# Patient Record
Sex: FEMALE | Race: OTHER | Employment: PART TIME | URBAN - METROPOLITAN AREA
[De-identification: names, ages, dates, MRNs, and addresses within clinical notes are randomized per-mention and may not be internally consistent; named-entity substitution may affect disease eponyms.]

---

## 2020-04-06 ENCOUNTER — TELEPHONE (OUTPATIENT)
Dept: FAMILY MEDICINE CLINIC | Age: 35
End: 2020-04-06

## 2020-04-06 NOTE — TELEPHONE ENCOUNTER
Outbound call placed to patient. name and  verified. Call placed to reschedule patients appointment due to COVID-19 pandemic. Patient declined virtual visit. Patient scheduled for 2020 08:30 AM patient reported understanding and appreciative of call.

## 2020-06-19 ENCOUNTER — VIRTUAL VISIT (OUTPATIENT)
Dept: FAMILY MEDICINE CLINIC | Age: 35
End: 2020-06-19

## 2020-06-19 DIAGNOSIS — K20.0 EE (EOSINOPHILIC ESOPHAGITIS): ICD-10-CM

## 2020-06-19 DIAGNOSIS — M25.561 CHRONIC PAIN OF RIGHT KNEE: ICD-10-CM

## 2020-06-19 DIAGNOSIS — G89.29 CHRONIC PAIN OF RIGHT KNEE: ICD-10-CM

## 2020-06-19 DIAGNOSIS — F41.8 DEPRESSION WITH ANXIETY: ICD-10-CM

## 2020-06-19 DIAGNOSIS — Z76.89 ENCOUNTER TO ESTABLISH CARE: Primary | ICD-10-CM

## 2020-06-19 DIAGNOSIS — E66.09 OBESITY DUE TO EXCESS CALORIES WITHOUT SERIOUS COMORBIDITY, UNSPECIFIED CLASSIFICATION: ICD-10-CM

## 2020-06-19 DIAGNOSIS — M50.30 DDD (DEGENERATIVE DISC DISEASE), CERVICAL: ICD-10-CM

## 2020-06-19 DIAGNOSIS — G43.909 MIGRAINE WITHOUT STATUS MIGRAINOSUS, NOT INTRACTABLE, UNSPECIFIED MIGRAINE TYPE: ICD-10-CM

## 2020-06-19 DIAGNOSIS — Z98.1 H/O SPINAL FUSION: ICD-10-CM

## 2020-06-19 RX ORDER — AMOXICILLIN 500 MG/1
TABLET, FILM COATED ORAL
COMMUNITY
Start: 2020-03-13 | End: 2020-06-19

## 2020-06-19 RX ORDER — PANTOPRAZOLE SODIUM 40 MG/1
40 TABLET, DELAYED RELEASE ORAL 2 TIMES DAILY
COMMUNITY
End: 2020-10-21 | Stop reason: SDUPTHER

## 2020-06-19 RX ORDER — DICLOFENAC SODIUM 10 MG/G
2 GEL TOPICAL
Qty: 100 G | Refills: 0 | Status: SHIPPED | OUTPATIENT
Start: 2020-06-19 | End: 2020-12-02

## 2020-06-19 RX ORDER — TOPIRAMATE 50 MG/1
50 TABLET, FILM COATED ORAL DAILY
COMMUNITY
End: 2020-10-21 | Stop reason: ALTCHOICE

## 2020-06-19 NOTE — PATIENT INSTRUCTIONS
Follow up with a behavioral therapist for evaluation and management of your mood. If you do not already see a therapist, you can find a list through [facility] by calling the mental help line number on the back of your insurance card. Here are a few local providers: 
[facility]: [phone] [facility] Mental health  [phone] (can prescribe medications) [employer] [phone] (can prescribe medications) [address] [place] [address] [phone] (can prescribe medications) [employer] Patellofemoral Pain Syndrome (Runner's Knee): Exercises Introduction Here are some examples of exercises for you to try. The exercises may be suggested for a condition or for rehabilitation. Start each exercise slowly. Ease off the exercises if you start to have pain. You will be told when to start these exercises and which ones will work best for you. How to do the exercises Calf wall stretch 1. Stand facing a wall with your hands on the wall at about eye level. Put your affected leg about a step behind your other leg. 2. Keeping your back leg straight and your back heel on the floor, bend your front knee and gently bring your hip and chest toward the wall until you feel a stretch in the calf of your back leg. 3. Hold the stretch for at least 15 to 30 seconds. 4. Repeat 2 to 4 times. 5. Repeat steps 1 through 4, but this time keep your back knee bent. Quadriceps stretch 1. If you are not steady on your feet, hold on to a chair, counter, or wall. 2. Bend your affected leg, and reach behind you to grab the front of your foot or ankle with the hand on the same side. For example, if you are stretching your right leg, use your right hand. 3. Keeping your knees next to each other, pull your foot toward your buttock until you feel a gentle stretch across the front of your hip and down the front of your thigh.  Your knee should be pointed directly to the ground, and not out to the side. 4. Hold the stretch for at least 15 to 30 seconds. 5. Repeat 2 to 4 times. Hamstring wall stretch 1. Lie on your back in a doorway, with your good leg through the open door. 2. Slide your affected leg up the wall to straighten your knee. You should feel a gentle stretch down the back of your leg. 3. Hold the stretch for at least 1 minute. Then over time, try to lengthen the time you hold the stretch to as long as 6 minutes. 4. Repeat 2 to 4 times. 5. If you do not have a place to do this exercise in a doorway, there is another way to do it: 6. Lie on your back, and bend your affected leg. 7. Loop a towel under the ball and toes of that foot, and hold the ends of the towel in your hands. 8. Straighten your knee, and slowly pull back on the towel. You should feel a gentle stretch down the back of your leg. 9. Hold the stretch for at least 15 to 30 seconds. Or even better, hold the stretch for 1 minute if you can. 10. Repeat 2 to 4 times. 1. Do not arch your back. 2. Do not bend either knee. 3. Keep one heel touching the floor and the other heel touching the wall. Do not point your toes. [facility] 1. Sit with your affected leg straight and supported on the floor or a firm bed. Place a small, rolled-up towel under your affected knee. Your other leg should be bent, with that foot flat on the floor. 2. Tighten the thigh muscles of your affected leg by pressing the back of your knee down into the towel. 3. Hold for about 6 seconds, then rest for up to 10 seconds. 4. Repeat 8 to 12 times. Straight-leg raises to the front 1. Lie on your back with your good knee bent so that your foot rests flat on the floor. Your affected leg should be straight. Make sure that your low back has a normal curve. You should be able to slip your hand in between the floor and the small of your back, with your palm touching the floor and your back touching the back of your hand. 2. Tighten the thigh muscles in your affected leg by pressing the back of your knee flat down to the floor. Hold your knee straight. 3. Keeping the thigh muscles tight and your leg straight, lift your affected leg up so that your heel is about 12 inches off the floor. 4. Hold for about 6 seconds, then lower your leg slowly. Rest for up to 10 seconds between repetitions. 5. Repeat 8 to 12 times. Straight-leg raises to the back 1. Lie on your stomach, and lift your leg straight up behind you (toward the ceiling). 2. Lift your toes about 6 inches off the floor, hold for about 6 seconds, then lower slowly. 3. Do 8 to 12 repetitions. Wall slide with ball squeeze 1. Stand with your back against a wall and with your feet about shoulder-width apart. Your feet should be about 12 inches away from the wall. 2. Put a ball about the size of a soccer ball between your knees. Then slowly slide down the wall until your knees are bent about 20 to 30 degrees. 3. Tighten your thigh muscles by squeezing the ball between your knees. Hold that position for about 10 seconds, then stop squeezing. Rest for up to 10 seconds between repetitions. 4. Repeat 8 to 12 times. Follow-up care is a key part of your treatment and safety. Be sure to make and go to all appointments, and call your doctor if you are having problems. It's also a good idea to know your test results and keep a list of the medicines you take. Where can you learn more? Go to http://adalberto-tay.info/ Enter A404 in the search box to learn more about \"Patellofemoral Pain Syndrome (Runner's Knee): Exercises. \" Current as of: March 2, 2020               Content Version: 12.5 © 5339-3991 [employer]. Care instructions adapted under license by [employer] (which disclaims liability or warranty for this information).  If you have questions about a medical condition or this instruction, always ask your healthcare professional. [address] any warranty or liability for your use of this information.

## 2020-06-19 NOTE — PROGRESS NOTES
[id number] [address] Note      Subjective:     Chief Complaint   Patient presents with    New Patient     establish care    Back Pain     x 1 year    Knee Swelling     x 2 month      Ashraf Doctor is a 36y.o. year old female who presents for virtual evaluation of the following:      Establishment of Care:  Previous PCP: Dr. Samantha Reddy in [place]  Patient Care Team:  Vera Hutchisn MD as PCP - [facility])   Dentist- None  Optho- none  [facility]  [place] - [place]  [place] in [place]    PMH:   Eosinophilic Esophagitis:   Diagnosed 2015  Sx: food sticks while eating intermittently  Tx: pantoprazole  Has had multiple esophageal dilations  Managed BY GI and needs new referral     DDD, cervical  Chronic > 5 year  S/p Disc replacement and fusion  Sx: intermittnet neck pain radiates down back  - occuring weekly  Managed by spinal specialist    Migraines:   Chronic diagnosed age 13  Occurring twice per justus in past 6 month  Tx: Topamax  Denies vision change, current headache     Depression with Anxiety:  Chronic. Diagnosed age 14  Current Mood/Symptoms: feeling more anxiety this year  Triggers:no known  Denies admission for mood or suicide attmept  Treatment:   Key Psychotherapeutic Meds     Patient is on no psychotherapeutic meds.       Previous Tx: name unknwon  Therapist: None currently   Managed by PCP  MELISSA: 4  3 most recent PHQ Screens 6/19/2020   Little interest or pleasure in doing things Several days   Feeling down, depressed, irritable, or hopeless Not at all   Total Score PHQ 2 1   Trouble falling or staying asleep, or sleeping too much Nearly every day   Feeling tired or having little energy Nearly every day   Poor appetite, weight loss, or overeating Not at all   Feeling bad about yourself - or that you are a failure or have let yourself or your family down Several days   Trouble concentrating on things such as school, work, reading, or watching TV Not at all   Moving or speaking so slowly that other people could have noticed; or the opposite being so fidgety that others notice Not at all   Thoughts of being better off dead, or hurting yourself in some way Not at all   PHQ 9 Score 8     Asthma/Seasonal Allergies  Chronic since  Age 9  Tx: albuterol prn, benadryl prn  Last albuterol use 7 months ago  Previous daily inhaler name unknwon purple disc but not used in 1year  Never amdmitted or intubated for asthma    Obesity:   Patient perception: \"I gained weight\"  Diet: Cooking more at home, skips breakfast  -No food log in office  Activity: Walking a home 3 days per week since 2/2020  Treatment:  Key Obesity Meds     Patient is on no anti-obesity meds. Previous Treatment: None  Last Weight Metrics:  No flowsheet data found.        Acute Concerns:  Right Knee Swelling/ Pain:   Onset 2 month ago  Not swollen now  Interment worse with walking   Has been walking more for exercise  Tx: ibuprofen  Deieis injury, difficulty walking, redness, fever        Social:   Employment- works for [employer] at [employer] with 3 kids, sister, and brother in law  - youngest child is autistic        Past Medical History:   Diagnosis Date    Anxiety     Asthma     EE (eosinophilic esophagitis)         Social History     Socioeconomic History    Marital status: SINGLE     Spouse name: Not on file    Number of children: Not on file    Years of education: Not on file    Highest education level: Not on file   Occupational History    Not on file   Social Needs    Financial resource strain: Not on file    Food insecurity     Worry: Not on file     Inability: Not on file   [employer] needs     Medical: Not on file     Non-medical: Not on file   Tobacco Use    Smoking status: Never Smoker    Smokeless tobacco: Never Used   Substance and Sexual Activity    Alcohol use: Not Currently    Drug use: Never    Sexual activity: Not Currently   Lifestyle    Physical activity     Days per week: Not on file     Minutes per session: Not on file    Stress: Not on file   Relationships    Social connections     Talks on phone: Not on file     Gets together: Not on file     Attends Adventism service: Not on file     Active member of club or organization: Not on file     Attends meetings of clubs or organizations: Not on file     Relationship status: Not on file    Intimate partner violence     Fear of current or ex partner: Not on file     Emotionally abused: Not on file     Physically abused: Not on file     Forced sexual activity: Not on file   Other Topics Concern    Not on file   Social History Narrative    Not on file       Family History   Problem Relation Age of Onset    Other Mother         Plantar Faciaitis    Cancer Father         prostate    Depression Father        Current Outpatient Medications   Medication Sig    pantoprazole (Protonix) 40 mg tablet Take 40 mg by mouth two (2) times a day.  topiramate (Topamax) 50 mg tablet Take 50 mg by mouth daily.  amoxicillin 500 mg tab TAKE 1 TABLET BY MOUTH EVERY 12 HOURS FOR 10 DAYS     No current facility-administered medications for this visit. Objective:   No flowsheet data found. Physical Examination:  General: Alert, cooperative, no distress, appears stated age. Obese  Eyes: Conjunctivae clear. Pupils equally round. Extraocular muscles intact. Ears: Normal appearing external ear   Nose: Nares normal appearing  Mouth/Throat: Lips, mucosa, and tongue normal. Moist mucous membranes. No tonsillar enlargement noted. Neck: Supple, symmetrical, trachea midline, no neck mass visualized. Respiratory: Breathing comfortably, in no acute respiratory distress. Cardiovascular: Visualized extremities without edema. MSK: Upper extremities normal appearing. Gait steady and unassisted.   - No knee swelling noted   Skin: No significant erythematous lesions or discoloration noted on facial skin. No rashes or lesions on exposed skin. Neurologic: No facial asymmetry. Normal gaze. Cranial nerves intact. Psychiatric: Affect appropriate. Mood euthymic. Thoughts logical. Speech volume and speed normal. No hallucinations. Well kempt. No visits with results within 3 Month(s) from this visit. Latest known visit with results is:   No results found for any previous visit. Assessment/ Plan:   Diagnoses and all orders for this visit:    1. Encounter to establish care    2. EE (eosinophilic esophagitis)    3. DDD (degenerative disc disease), cervical    4. H/O spinal fusion    5. Migraine without status migrainosus, not intractable, unspecified migraine type    6. Depression with anxiety    7. Obesity due to excess calories without serious comorbidity, unspecified classification    8. Chronic pain of right knee        For today's visit, I did the following:  · Reviewed PMH as listed in orders  Eosinophilic Esophagitis, per patient report. Stable. . Referral to GI to establish care. Diet and lifestyle modification encouraged for weight loss and chronic disease prevention/ management. Knee pain likely patellofemoral syndrome from recent walking. Start daily knee stretches and topical NSIAD. Chronic neck pain from cerical DDD, S.p fusion   -Exercises and NSAIDs for musculoskeletal concern  Depression with anxiety well controlled of medications. On the basis of positive PHQ-9 screening (PHQ 9 Score: 8), patient instructed to schedule follow-up visit at this practice. Patient will follow-up in 3 months. Encouraged counseling for mood disorder. Follow up with specialists per routine. We discussed the expected course, resolution and complications of the diagnosis(es) in detail. Medication risks, benefits, costs, interactions, and alternatives were discussed as indicated.   I advised her to contact the office if her condition worsens, changes or fails to improve as anticipated. She expressed understanding with the diagnosis(es) and plan. Shannen Langford is a 36 y.o. female being evaluated by a video visit encounter for concerns as above. A caregiver was present when appropriate. Due to this being a TeleHealth encounter (During [facility] public health emergency), evaluation of the following organ systems was limited: Vitals/Constitutional/EENT/Resp/CV/GI//MS/Neuro/Skin/Heme-Lymph-Imm. Pursuant to the emergency declaration under the [address] [place] [address], [facility] waiver authority and the [employer] Act, this Virtual  Visit was conducted, with patient's (and/or legal guardian's) consent, to reduce the patient's risk of exposure to COVID-19 and provide necessary medical care. Services were provided through a video synchronous discussion virtually to substitute for in-person clinic visit. Provider was at home while conducting this encounter. Patient was at home during encounter. Other persons participating in call: None  Consent:  She and/or her healthcare decision maker is aware that this patient-initiated Telehealth encounter is a billable service, with coverage as determined by her insurance carrier. She is aware that she may receive a bill and has provided verbal consent to proceed: Yes  This virtual visit was conducted via [employer] me. Pursuant to the emergency declaration under the [address] [place] [address], [facility] authority and the [employer] Act, this Virtual  Visit was conducted to reduce the patient's risk of exposure to COVID-19 and provide continuity of care for an established patient. Services were provided through a video synchronous discussion virtually to substitute for in-person clinic visit.   Due to this being a TeleHealth evaluation, many elements of the physical examination are unable to be assessed. Total Time: minutes: 21-30 minutes. Educated patient on red flag symptoms to warrant return to clinic or emergency room visit. I have discussed the diagnosis with the patient and the intended plan as seen in the above orders. The patient has been offered or received an after-visit summary and questions were answered concerning future plans. I have discussed medication side effects and warnings with the patient as well. Follow-up and Dispositions    · Return in about 3 months (around 9/19/2020) for Physical exam, Follow Up.          Signed,    Ricardo Ceballos MD  6/19/2020

## 2020-06-19 NOTE — PROGRESS NOTES
Chief Complaint   Patient presents with    New Patient     establish care    Back Pain     x 1 year    Knee Swelling     x 2 month      1. Have you been to the ER, urgent care clinic since your last visit? Hospitalized since your last visit? No    2. Have you seen or consulted any other health care providers outside of the [address] [place] [address] since your last visit? Include any pap smears or colon screening.  No  r

## 2020-09-13 ENCOUNTER — TELEPHONE (OUTPATIENT)
Dept: FAMILY MEDICINE CLINIC | Age: 35
End: 2020-09-13

## 2020-09-13 NOTE — TELEPHONE ENCOUNTER
Chief Complaint   Patient presents with    Prior Auth     DICLOFENAC SODIUM 1 % GEL      AWAITING RESPONSE.   Torri Vidales LPN

## 2020-09-18 ENCOUNTER — TELEPHONE (OUTPATIENT)
Dept: FAMILY MEDICINE CLINIC | Age: 35
End: 2020-09-18

## 2020-09-18 NOTE — TELEPHONE ENCOUNTER
Called Pt and left vm. Notified to call back about the request for Diclofenac sodium gel 1% was denied. Stated to give the office a call back to let her know to try OTC volteran gel or lidocaine gel.

## 2020-10-21 ENCOUNTER — OFFICE VISIT (OUTPATIENT)
Dept: FAMILY MEDICINE CLINIC | Age: 35
End: 2020-10-21
Payer: MEDICAID

## 2020-10-21 VITALS
OXYGEN SATURATION: 99 % | HEART RATE: 92 BPM | HEIGHT: 62 IN | SYSTOLIC BLOOD PRESSURE: 102 MMHG | RESPIRATION RATE: 18 BRPM | DIASTOLIC BLOOD PRESSURE: 75 MMHG | WEIGHT: 180.4 LBS | BODY MASS INDEX: 33.2 KG/M2

## 2020-10-21 DIAGNOSIS — M54.50 CHRONIC BILATERAL LOW BACK PAIN WITHOUT SCIATICA: ICD-10-CM

## 2020-10-21 DIAGNOSIS — G89.29 CHRONIC BILATERAL LOW BACK PAIN WITHOUT SCIATICA: ICD-10-CM

## 2020-10-21 DIAGNOSIS — E55.9 VITAMIN D DEFICIENCY: ICD-10-CM

## 2020-10-21 DIAGNOSIS — G43.909 MIGRAINE WITHOUT STATUS MIGRAINOSUS, NOT INTRACTABLE, UNSPECIFIED MIGRAINE TYPE: ICD-10-CM

## 2020-10-21 DIAGNOSIS — K20.0 EE (EOSINOPHILIC ESOPHAGITIS): ICD-10-CM

## 2020-10-21 DIAGNOSIS — M50.30 DDD (DEGENERATIVE DISC DISEASE), CERVICAL: ICD-10-CM

## 2020-10-21 DIAGNOSIS — E66.09 OBESITY DUE TO EXCESS CALORIES WITHOUT SERIOUS COMORBIDITY, UNSPECIFIED CLASSIFICATION: ICD-10-CM

## 2020-10-21 DIAGNOSIS — F41.8 DEPRESSION WITH ANXIETY: ICD-10-CM

## 2020-10-21 DIAGNOSIS — F33.0 DEPRESSION, MAJOR, RECURRENT, MILD (HCC): ICD-10-CM

## 2020-10-21 DIAGNOSIS — M79.672 PAIN OF LEFT HEEL: ICD-10-CM

## 2020-10-21 DIAGNOSIS — Z00.00 WELL WOMAN EXAM (NO GYNECOLOGICAL EXAM): Primary | ICD-10-CM

## 2020-10-21 PROCEDURE — 99395 PREV VISIT EST AGE 18-39: CPT | Performed by: FAMILY MEDICINE

## 2020-10-21 PROCEDURE — 99213 OFFICE O/P EST LOW 20 MIN: CPT | Performed by: FAMILY MEDICINE

## 2020-10-21 RX ORDER — AMITRIPTYLINE HYDROCHLORIDE 25 MG/1
25 TABLET, FILM COATED ORAL
Qty: 90 TAB | Refills: 1 | Status: SHIPPED | OUTPATIENT
Start: 2020-10-21 | End: 2020-12-02

## 2020-10-21 RX ORDER — PANTOPRAZOLE SODIUM 40 MG/1
40 TABLET, DELAYED RELEASE ORAL 2 TIMES DAILY
Qty: 180 TAB | Refills: 1 | Status: SHIPPED | OUTPATIENT
Start: 2020-10-21 | End: 2021-07-04

## 2020-10-21 RX ORDER — TOPIRAMATE 50 MG/1
50 TABLET, FILM COATED ORAL DAILY
Status: CANCELLED | OUTPATIENT
Start: 2020-10-21

## 2020-10-21 NOTE — PROGRESS NOTES
[facility] Note      Subjective:     Chief Complaint   Patient presents with    Complete Physical     Youlanda Stairs is a 30y.o. year old female who presents for virtual evaluation of the following:      PMH:   Eosinophilic Esophagitis:   Diagnosed 2015  Sx: food sticks while eating intermittently  Tx: pantoprazole bid  - ran out ans symptoms worsening. Using tums instead  Has had multiple esophageal dilations  Managed BY GI   - plans to see Gi in 11/2oso    DDD, cervical  Chronic > 5 year  S/p Disc replacement and fusion  Sx: intermittent neck pain radiates down back  Managed by spinal specialist    Migraines:   Chronic diagnosed age 13  Occurring twice per justus in past 6 month  Tx: Topamax  - ran out 3 weeks ago  - states not helping in the past few months  Denies vision change, current headache     Depression with Anxiety:  Chronic. Diagnosed age 14  Current Mood/Symptoms: feeling more anxiety this year  Triggers:no known  Denies admission for mood or suicide attmept  Treatment:   Key Psychotherapeutic Meds     Patient is on no psychotherapeutic meds.       Previous Tx: name unknwon  Therapist: None currently   Managed by PCP  MELISSA: 4> 8  3 most recent PHQ Screens 10/21/2020   Little interest or pleasure in doing things Not at all   Feeling down, depressed, irritable, or hopeless Several days   Total Score PHQ 2 1   Trouble falling or staying asleep, or sleeping too much Nearly every day   Feeling tired or having little energy More than half the days   Poor appetite, weight loss, or overeating Not at all   Feeling bad about yourself - or that you are a failure or have let yourself or your family down Not at all   Trouble concentrating on things such as school, work, reading, or watching TV Several days   Moving or speaking so slowly that other people could have noticed; or the opposite being so fidgety that others notice Not at all   Thoughts of being better off dead, or hurting yourself in some way Not at all   PHQ 9 Score 7       Left Heel Pain  Onset 1 month ago   Intermittent  Character sharp  Worse first thing in the morning 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 Shoes -addida boosts- 3year old  Tx: Ibuprofen prn  Multiple family members with plantar fasciitis. Low Back Pain  Chronic > 1 year bilateral low back   Tx: ibuprofen prn  No reported injury  Known Cervical DDD  Paul pain radiation     Obesity:   Patient perception: \"I gained weight\"  Diet: avoiding fast food, avoiding pasta and breading, avoid hig sodium broth  -No food log in office  Activity: Walking a home 3 days per week since 2/2020  Treatment:  Key Obesity Meds     Patient is on no anti-obesity meds. Previous Treatment: None  Last Weight Metrics:  Weight Loss Metrics 10/21/2020   Today's Wt 180 lb 6.4 oz   BMI 33 kg/m2        Health Maintenance:   Health Maintenance   Topic Date Due    DTaP/Tdap/Td series (1 - Tdap) 09/14/2007    PAP AKA CERVICAL CYTOLOGY  09/14/2007    Flu Vaccine (1) 06/30/2021 (Originally 9/1/2020)    Pneumococcal 0-64 years  Aged Out   - Tdap in pregnancy 2017    HIV or other STD testing: Declined  Domestic Violence Screen:   Abuse Screening Questionnaire 6/19/2020   Do you ever feel afraid of your partner? N   Are you in a relationship with someone who physically or mentally threatens you? N   Is it safe for you to go home?  Y       Depression Screen:   3 most recent PHQ Screens 10/21/2020   Little interest or pleasure in doing things Not at all   Feeling down, depressed, irritable, or hopeless Several days   Total Score PHQ 2 1   Trouble falling or staying asleep, or sleeping too much -   Feeling tired or having little energy -   Poor appetite, weight loss, or overeating -   Feeling bad about yourself - or that you are a failure or have let yourself or your family down -   Trouble concentrating on things such as school, work, reading, or watching TV - Moving or speaking so slowly that other people could have noticed; or the opposite being so fidgety that others notice -   Thoughts of being better off dead, or hurting yourself in some way -   PHQ 9 Score -       Smoker? never      Pap:  Last   Mammogram: Not due  No LMP recorded. (Menstrual status: IUD). reports previously being sexually active.       Social:   Employment- works for [email or web address] with 3 kids, sister, and brother in law  - youngest child is autistic    Patient Care Team:  Wanda Melo MD as PCP - General (Family Medicine)  Wanda Melo MD as PCP - [facility] Empaneled Provider   Dentist- None  Optho- none  GYN- [facility]  [place] - [place] in [place]          Past Medical History:   Diagnosis Date    Anxiety     Asthma     EE (eosinophilic esophagitis)         Social History     Socioeconomic History    Marital status: SINGLE     Spouse name: Not on file    Number of children: Not on file    Years of education: Not on file    Highest education level: Not on file   Occupational History    Not on file   Social Needs    Financial resource strain: Not on file    Food insecurity     Worry: Not on file     Inability: Not on file    Transportation needs     Medical: Not on file     Non-medical: Not on file   Tobacco Use    Smoking status: Never Smoker    Smokeless tobacco: Never Used   Substance and Sexual Activity    Alcohol use: Not Currently    Drug use: Never    Sexual activity: Not Currently   Lifestyle    Physical activity     Days per week: Not on file     Minutes per session: Not on file    Stress: Not on file   Relationships    Social connections     Talks on phone: Not on file     Gets together: Not on file     Attends Mormonism service: Not on file     Active member of club or organization: Not on file     Attends meetings of clubs or organizations: Not on file     Relationship status: Not on file    Intimate partner violence     Fear of current or ex partner: Not on file     Emotionally abused: Not on file     Physically abused: Not on file     Forced sexual activity: Not on file   Other Topics Concern    Not on file   Social History Narrative    Not on file       Family History   Problem Relation Age of Onset    Other Mother         Plantar Faciaitis    Cancer Father         prostate    Depression Father        Current Outpatient Medications   Medication Sig    pantoprazole (Protonix) 40 mg tablet Take 40 mg by mouth two (2) times a day.  topiramate (Topamax) 50 mg tablet Take 50 mg by mouth daily.  diclofenac (VOLTAREN) 1 % gel Apply 2 g to affected area four (4) times daily as needed for Pain. (Patient not taking: Reported on 10/21/2020)     No current facility-administered medications for this visit. Objective:   Visit Vitals  /75   Pulse 92   Resp 18   Ht 5' 2\" (1.575 m)   Wt 180 lb 6.4 oz (81.8 kg)   SpO2 99%   BMI 33.00 kg/m²     Physical Examination:  General: Alert, cooperative, no distress, appears stated age. Obese  Eyes: Conjunctivae clear. Pupils equally round and reactive to light, Extraocular muscles intact. Ears: Normal external ear canals both ears. Tympanic membranes clear and mobile bilaterally   Nose: Nares normal. Septum midline. Mucosa normal. No drainage or sinus tenderness. Mouth/Throat: Lips, mucosa, and tongue normal. No oropharyngeal erythema. No tonsillar enlargement or exudate. Neck: Supple, symmetrical, trachea midline, no adenopathy. No thyroid enlargement/tenderness/nodules  Respiratory: Breathing comfortably, in no acute respiratory distress. Clear to auscultation bilaterally. Normal inspiratory and expiratory ratio. Cardiovascular: Regular rate and rhythm, S1, S2 normal, no murmur, click, rub or gallop.   -Extremities no edema. Pulses 2+ and symmetric radial and dorsalis pedis   Abdomen: Soft, non-tender, not distended.  Bowel sounds normal. No masses or organomegaly. MSK: Extremities normal appearing, atraumatic, no effusion. Gait steady and unassisted. Back symmetric, no curvature. Range of motion normal. No Costovertebral angle tenderness.  -Negative straight leg raise bilaterally.  -Lumbar paraspinal muscle tenderness bilaterally. No SI joint tenderness.  -Left heel nontender, no deformity, ankle with full range of motion. Skin: Skin color, texture, turgor normal. No rashes or lesions on exposed skin. Lymph nodes: Cervical, supraclavicular nodes normal.  Neurologic: Cranial nerves II-XII intact. Strength 5/5 grossly. Sensation and reflexes normal throughout. Psychiatric: Affect mild anxious- difficulty relaxing muscle during foot examinaiton. Mood anxious. Thoughts logical. Speech volume and speed normal        No visits with results within 3 Month(s) from this visit. Latest known visit with results is:   No results found for any previous visit. Assessment/ Plan:   Diagnoses and all orders for this visit:    1. Well woman exam (no gynecological exam)  -     CBC WITH AUTOMATED DIFF  -     METABOLIC PANEL, COMPREHENSIVE  -     LIPID PANEL    2. Chronic bilateral low back pain without sciatica  -     XR SPINE LUMB 2 OR 3 V; Future  -     REFERRAL TO PHYSICAL THERAPY  -     CK    3. EE (eosinophilic esophagitis)  -     pantoprazole (Protonix) 40 mg tablet; Take 1 Tab by mouth two (2) times a day. 4. Migraine without status migrainosus, not intractable, unspecified migraine type  -     amitriptyline (ELAVIL) 25 mg tablet; Take 1 Tab by mouth nightly. 5. Depression with anxiety  -     REFERRAL TO BEHAVIORAL HEALTH  -     amitriptyline (ELAVIL) 25 mg tablet; Take 1 Tab by mouth nightly. 6. Depression, major, recurrent, mild ([address]    7. DDD (degenerative disc disease), cervical    8. Pain of left heel    9. Vitamin D deficiency  -     VITAMIN D, 25 HYDROXY    10.  Obesity due to excess calories without serious comorbidity, unspecified classification  -     TSH REFLEX TO T4        For today's visit, I did the following:  · Reviewed PMH as listed in orders  Eosinophilic Esophagitis, per patient report. Stable. . We will refill pantoprazole. Follow-up with GI per routine. Chronic migraines, worsening recently. Suspect triggered by stress. Referral to behavioral specialist for stress management. Trial amitriptyline for migraine prevention. Diet and lifestyle modification encouraged for weight loss and chronic disease prevention/ management. Chronic neck pain from cerical DDD and chronc low back pain liekly lumbar DDD or muscle strain. Referral to physical therapy for evaluation and treatment. He will pain likely plantar fasciitis versus tendinitis. Start daily foot stretches to improve pain. Trial topical over-the-counter pain creams such as Voltaren or lidocaine. Monitor vitamin D with labs. Depression with anxiety mild uncontrolled. We will add amitriptyline for migraines which may help with depression component. If anxiety worsens consider change from amitriptyline to venlafaxine. On the basis of positive PHQ-9 screening (PHQ 9 Score: 7), patient instructed to schedule follow-up visit at this practice. Patient will follow-up in 3 months. Encouraged counseling for mood disorder-referred to behavioral health specialist.  Follow up with specialists per routine. We discussed the expected course, resolution and complications of the diagnosis(es) in detail. Medication risks, benefits, costs, interactions, and alternatives were discussed as indicated. I advised her to contact the office if her condition worsens, changes or fails to improve as anticipated. She expressed understanding with the diagnosis(es) and plan. Educated patient on red flag symptoms to warrant return to clinic or emergency room visit. I have discussed the diagnosis with the patient and the intended plan as seen in the above orders.   The patient has been offered or received an after-visit summary and questions were answered concerning future plans. I have discussed medication side effects and warnings with the patient as well. Follow-up and Dispositions    · Return in about 3 months (around 1/21/2021) for Follow Up migraines and mood.          Signed,    Felipe Kirby MD  10/21/2020

## 2020-10-21 NOTE — PROGRESS NOTES
Identified pt with two pt identifiers(name and ). Reviewed record in preparation for visit and have obtained necessary documentation. Chief Complaint   Patient presents with    Complete Physical        Health Maintenance Due   Topic    PAP AKA CERVICAL CYTOLOGY      Flu vaccine: Pt declined. DTaP: Pt will discuss      Visit Vitals  /75   Pulse 92   Resp 18   Ht 5' 2\" (1.575 m)   Wt 180 lb 6.4 oz (81.8 kg)   SpO2 99%   BMI 33.00 kg/m²         Coordination of Care Questionnaire:  :   1) Have you been to an emergency room, urgent care, or hospitalized since your last visit? If yes, where when, and reason for visit? no       2. Have seen or consulted any other health care provider since your last visit? If yes, where when, and reason for visit? NO      Patient is accompanied by self I have received verbal consent from Stacy Cerda to discuss any/all medical information while they are present in the room.

## 2020-10-21 NOTE — PATIENT INSTRUCTIONS
Weight Loss Tips: 
Remember this is like a part time job so your motivation and commitment is key to your success. Mindset Weight loss like any other behavior change starts in your mind. Think hard about what your motivates you to lose weight then meditate on that. Remind yourself of your motivation 
with phone alarms, scheduled meditation time, vision board, journal- just to name a few ideas. Have realistic goals. We expect with diligent healthy diet and physical activity you can lose 5% of your body weight in 3 
months. Wt in lbs x 0.05 = #lbs you should lose in 3 months. Make food and activity changes with a goal of CONSISTENCY not perfection. Food Start eating differently. Most of your weight loss and gain is from what you eat. Use small plates only Drink 2 liters (1/2 gallon) of water every day HALF of every meal should be fruit or vegetables Try meal prepping on Sunday (or your day off) with new different vegetables. Consider meal prep service such as [employer], wepremeals. com Replace soda with diet soda or other zero sugar drinks (selter water just fine) Consider using the Relcy viktor for calorie counting. Goal 5200-4263 calories per day Activity Staying physically active will help you lose more weight and can help you get over the plateau when you weight just 
won't change any more with diet. Start exercise at least 5 days per week for 40 minutes. Consider Denty's training viktor for home exercises. You can start with walking. I suggest walking at a speed of at least 3.5-4.5mph to for the weight loss benefit. Increase your speed or distance every 2 weeks. Do some slow stretching daily of legs, arms and back. Consider adding weight training with light weights at home or at the gym. See a doctor or a physical training for 
instructions in order to avoid injuries from doing muscle training incorrectly.  
Free fitness program in [place]: [employer] [email or web address] 
 
 
  
Plantar Fasciitis: Exercises Introduction Here are some examples of exercises for you to try. The exercises may be suggested for a condition or for rehabilitation. Start each exercise slowly. Ease off the exercises if you start to have pain. You will be told when to start these exercises and which ones will work best for you. How to do the exercises Towel stretch 1. Sit with your legs extended and knees straight. 2. Place a towel around your foot just under the toes. 3. Hold each end of the towel in each hand, with your hands above your knees. 4. Pull back with the towel so that your foot stretches toward you. 5. Hold the position for at least 15 to 30 seconds. 6. Repeat 2 to 4 times a session, up to 5 sessions a day. Calf stretch This exercise stretches the muscles at the back of the lower leg (the calf) and the Achilles tendon. Do this exercise 3 or 4 times a day, 5 days a week. 1. Stand facing a wall with your hands on the wall at about eye level. Put the leg you want to stretch about a step behind your other leg. 2. Keeping your back heel on the floor, bend your front knee until you feel a stretch in the back leg. 3. Hold the stretch for 15 to 30 seconds. Repeat 2 to 4 times. Plantar fascia and calf stretch Stretching the plantar fascia and calf muscles can increase flexibility and decrease heel pain. You can do this exercise several times each day and before and after activity. 1. Stand on a step as shown above. Be sure to hold on to the banister. 2. Slowly let your heels down over the edge of the step as you relax your calf muscles. You should feel a gentle stretch across the bottom of your foot and up the back of your leg to your knee. 3. Hold the stretch about 15 to 30 seconds, and then tighten your calf muscle a little to bring your heel back up to the level of the step. Repeat 2 to 4 times. Towel curls Make this exercise more challenging by placing a weighted object, such as a soup can, on the other end of the towel. 1. While sitting, place your foot on a towel on the floor and scrunch the towel toward you with your toes. 2. Then, also using your toes, push the towel away from you. Spring pickups 1. Put marbles on the floor next to a cup. 
2. Using your toes, try to lift the marbles up from the floor and put them in the cup. Follow-up care is a key part of your treatment and safety. Be sure to make and go to all appointments, and call your doctor if you are having problems. It's also a good idea to know your test results and keep a list of the medicines you take. Where can you learn more? Go to http://www.salvador.com/ Live Cannon in the search box to learn more about \"Plantar Fasciitis: Exercises. \" Current as of: March 2, 2020               Content Version: 12.6 © 2006-2020 [employer]. Care instructions adapted under license by [employer] (which disclaims liability or warranty for this information). If you have questions about a medical condition or this instruction, always ask your healthcare professional. [address] any warranty or liability for your use of this information. Low Back Pain: Exercises Introduction Here are some examples of exercises for you to try. The exercises may be suggested for a condition or for rehabilitation. Start each exercise slowly. Ease off the exercises if you start to have pain. You will be told when to start these exercises and which ones will work best for you. How to do the exercises Press-up 1. Lie on your stomach, supporting your body with your forearms. 2. Press your elbows down into the floor to raise your upper back. As you do this, relax your stomach muscles and allow your back to arch without using your back muscles.  As your press up, do not let your hips or pelvis come off the floor. 3. Hold for 15 to 30 seconds, then relax. 4. Repeat 2 to 4 times. Alternate arm and leg (bird dog) exercise Do this exercise slowly. Try to keep your body straight at all times, and do not let one hip drop lower than the other. 1. Start on the floor, on your hands and knees. 2. Tighten your belly muscles. 3. Raise one leg off the floor, and hold it straight out behind you. Be careful not to let your hip drop down, because that will twist your trunk. 4. Hold for about 6 seconds, then lower your leg and switch to the other leg. 5. Repeat 8 to 12 times on each leg. 6. Over time, work up to holding for 10 to 30 seconds each time. 7. If you feel stable and secure with your leg raised, try raising the opposite arm straight out in front of you at the same time. Knee-to-chest exercise 1. Lie on your back with your knees bent and your feet flat on the floor. 2. Bring one knee to your chest, keeping the other foot flat on the floor (or keeping the other leg straight, whichever feels better on your lower back). 3. Keep your lower back pressed to the floor. Hold for at least 15 to 30 seconds. 4. Relax, and lower the knee to the starting position. 5. Repeat with the other leg. Repeat 2 to 4 times with each leg. 6. To get more stretch, put your other leg flat on the floor while pulling your knee to your chest.   
Curl-ups 1. Lie on the floor on your back with your knees bent at a 90-degree angle. Your feet should be flat on the floor, about 12 inches from your buttocks. 2. Cross your arms over your chest. If this bothers your neck, try putting your hands behind your neck (not your head), with your elbows spread apart. 3. Slowly tighten your belly muscles and raise your shoulder blades off the floor. 4. Keep your head in line with your body, and do not press your chin to your chest. 
5. Hold this position for 1 or 2 seconds, then slowly lower yourself back down to the floor. 6. Repeat 8 to 12 times. Pelvic tilt exercise 1. Lie on your back with your knees bent. 2. \"Brace\" your stomach. This means to tighten your muscles by pulling in and imagining your belly button moving toward your spine. You should feel like your back is pressing to the floor and your hips and pelvis are rocking back. 3. Hold for about 6 seconds while you breathe smoothly. 4. Repeat 8 to 12 times. Heel dig bridging 1. Lie on your back with both knees bent and your ankles bent so that only your heels are digging into the floor. Your knees should be bent about 90 degrees. 2. Then push your heels into the floor, squeeze your buttocks, and lift your hips off the floor until your shoulders, hips, and knees are all in a straight line. 3. Hold for about 6 seconds as you continue to breathe normally, and then slowly lower your hips back down to the floor and rest for up to 10 seconds. 4. Do 8 to 12 repetitions. Hamstring stretch in doorway 1. Lie on your back in a doorway, with one leg through the open door. 2. Slide your leg up the wall to straighten your knee. You should feel a gentle stretch down the back of your leg. 3. Hold the stretch for at least 15 to 30 seconds. Do not arch your back, point your toes, or bend either knee. Keep one heel touching the floor and the other heel touching the wall. 4. Repeat with your other leg. 5. Do 2 to 4 times for each leg. Hip flexor stretch 1. Kneel on the floor with one knee bent and one leg behind you. Place your forward knee over your foot. Keep your other knee touching the floor. 2. Slowly push your hips forward until you feel a stretch in the upper thigh of your rear leg. 3. Hold the stretch for at least 15 to 30 seconds. Repeat with your other leg. 4. Do 2 to 4 times on each side. Wall sit 1. Stand with your back 10 to 12 inches away from a wall. 2. Lean into the wall until your back is flat against it. 3. Slowly slide down until your knees are slightly bent, pressing your lower back into the wall. 4. Hold for about 6 seconds, then slide back up the wall. 5. Repeat 8 to 12 times. Follow-up care is a key part of your treatment and safety. Be sure to make and go to all appointments, and call your doctor if you are having problems. It's also a good idea to know your test results and keep a list of the medicines you take. Where can you learn more? Go to http://www.gray.com/ Enter P627 in the search box to learn more about \"Low Back Pain: Exercises. \" Current as of: March 2, 2020               Content Version: 12.6 © 2006-2020 [employer]. Care instructions adapted under license by [employer] (which disclaims liability or warranty for this information). If you have questions about a medical condition or this instruction, always ask your healthcare professional. [address] any warranty or liability for your use of this information.

## 2020-10-23 ENCOUNTER — TELEPHONE (OUTPATIENT)
Dept: FAMILY MEDICINE CLINIC | Age: 35
End: 2020-10-23

## 2020-10-23 NOTE — TELEPHONE ENCOUNTER
Made outgoing call to pt 10/22 and lvm requesting return call to r/s her appt with Alise Brooks. Made outgoing call to pt 10/23 and lvm requesting return call to r/s her appt with Alise Brooks on 10/28. MyChart message sent to pt letting her know that we have been trying to reach her. Advised pt in message appt has been canceled and to give our office a call back to r/s .

## 2020-10-28 ENCOUNTER — VIRTUAL VISIT (OUTPATIENT)
Dept: FAMILY MEDICINE CLINIC | Age: 35
End: 2020-10-28
Payer: MEDICAID

## 2020-10-28 DIAGNOSIS — F41.0 GENERALIZED ANXIETY DISORDER WITH PANIC ATTACKS: Primary | ICD-10-CM

## 2020-10-28 DIAGNOSIS — F41.1 GENERALIZED ANXIETY DISORDER WITH PANIC ATTACKS: Primary | ICD-10-CM

## 2020-10-28 PROCEDURE — 90791 PSYCH DIAGNOSTIC EVALUATION: CPT | Performed by: SOCIAL WORKER

## 2020-10-28 NOTE — PROGRESS NOTES
This note will not be vie  wable in [address] for the following reason(s). This is a Psychotherapy Note. ([address], [facility] [address] Providers Only)   Virtual At HOME Initial Appt:  Met with ms. Luis A Ferro today for our first appt. She reports that she experiences anxiety on a daily basis. Recently she has also had a few panic attacks, which is a new symptom. Additionally she reports lots of difficulty with her nightly sleeping. On an average, Ms. Luis A Ferro reports sleeping 4-5 hours per night. As she experiences frequent migraines, her sleep cycle is not helping prevent her migraines. She states that as a result of poor sleep, she is more irritable and loses her temper more often. As she has children at home for virtual learning, her irritability is a problem. Ms. Luis A Ferro is originally from [place] where her parents reside. Her father, 76, has just come to [place] to help  Her out with the kids  Her mother, 62, is still working and living in [place]. She has two older brothers: 36 and 39. One 39year old brother, according to Ms. Luis A Ferro is addicted to [employer]. \"  She is worried about him. Her father has hx of alcoholism and stopped drinking when the patient was in [employer] school. Ms Luis A Ferro had moved here in 2013, then moved back to [place]. To help her family. She again moved to [place] again several years ago. She has 3 children from 3 different relationships: a 15year old son, 5year old daughter and a 1year old son that was dx with Autism. 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 She was living on unemployment for several months until her business reopened. Ms. Luis A Ferro has experienced anxiety for many years. She was in counseling in  2009 but her sessions were sporadic. She reports she did not really find it helpful. We have scheduled a follow up session for two weeks from today, November 11 @ 2.   Radha Mckinley LCSW

## 2020-11-06 ENCOUNTER — VIRTUAL VISIT (OUTPATIENT)
Dept: FAMILY MEDICINE CLINIC | Age: 35
End: 2020-11-06
Payer: MEDICAID

## 2020-11-06 DIAGNOSIS — L98.9 SKIN LESION: ICD-10-CM

## 2020-11-06 DIAGNOSIS — M50.30 DDD (DEGENERATIVE DISC DISEASE), CERVICAL: Primary | ICD-10-CM

## 2020-11-06 PROCEDURE — 99214 OFFICE O/P EST MOD 30 MIN: CPT | Performed by: FAMILY MEDICINE

## 2020-11-06 NOTE — PROGRESS NOTES
Alexandr Form [facility] Note      Subjective:     Chief Complaint   Patient presents with    Neck Pain     Sx for 7-8 days now     Stewart Bernard is a 30y.o. year old female who presents for virtual evaluation of the following:      DDD, cervical  Chronic > 5 year  S/p Disc replacement and fusion  Sx: intermittnet neck pain radiates down back  - occuring weekly  Managed [place] spinal specialist in another state previously requesting new referral  Seen at urgent care this week and advised to go see orthopedist  - Xray with no acute fracture and with post surgical changes.    Tx: Steroid, tylenol #3    Spot on Leg  Present > 1 month ago   Has been cetting larger and darker  Previous PCP planned to refer to dermatology, patient requesting referral      Patient Care Team:  Dentist- None  Optho- none  [address] [place] [address]  Neck Specialist in [place]  Social:   Employment- works for [employer] at [employer] with 3 kids, sister, and brother in law  - youngest child is autistic        Past Medical History:   Diagnosis Date    Anxiety     Asthma     EE (eosinophilic esophagitis)         Social History     Socioeconomic History    Marital status: SINGLE     Spouse name: Not on file    Number of children: Not on file    Years of education: Not on file    Highest education level: Not on file   Occupational History    Not on file   Social Needs    Financial resource strain: Not on file    Food insecurity     Worry: Not on file     Inability: Not on file   [employer] needs     Medical: Not on file     Non-medical: Not on file   Tobacco Use    Smoking status: Never Smoker    Smokeless tobacco: Never Used   Substance and Sexual Activity    Alcohol use: Not Currently    Drug use: Never    Sexual activity: Not Currently   Lifestyle    Physical activity     Days per week: Not on file     Minutes per session: Not on file    Stress: Not on file Relationships    Social connections     Talks on phone: Not on file     Gets together: Not on file     Attends Confucianism service: Not on file     Active member of club or organization: Not on file     Attends meetings of clubs or organizations: Not on file     Relationship status: Not on file    Intimate partner violence     Fear of current or ex partner: Not on file     Emotionally abused: Not on file     Physically abused: Not on file     Forced sexual activity: Not on file   Other Topics Concern    Not on file   Social History Narrative    Not on file       Family History   Problem Relation Age of Onset    Other Mother         Plantar Faciaitis    Cancer Father         prostate    Depression Father        Current Outpatient Medications   Medication Sig    cyclobenzaprine HCl (FLEXERIL PO) Take 5 mg by mouth.  acetaminophen with codeine (TYLENOL-CODEINE #3 PO) Take 325 mg by mouth.  pantoprazole (Protonix) 40 mg tablet Take 1 Tab by mouth two (2) times a day.  amitriptyline (ELAVIL) 25 mg tablet Take 1 Tab by mouth nightly.  diclofenac (VOLTAREN) 1 % gel Apply 2 g to affected area four (4) times daily as needed for Pain. (Patient not taking: Reported on 10/21/2020)     No current facility-administered medications for this visit. Objective:   No flowsheet data found. Physical Examination:  General: Alert, cooperative, no distress, appears stated age. Obese  Eyes: Conjunctivae clear. Pupils equally round. Extraocular muscles intact. Ears: Normal appearing external ear   Nose: Nares normal appearing  Mouth/Throat: Lips, mucosa, and tongue normal. Moist mucous membranes. No tonsillar enlargement noted. Neck: Supple, symmetrical, trachea midline, no neck mass visualized. Respiratory: Breathing comfortably, in no acute respiratory distress. Cardiovascular: Visualized extremities without edema. MSK: Upper extremities normal appearing. Gait steady and unassisted.   - No knee swelling noted   Skin: Appearance of subcentimeter hyperpigmented macule on leg  Neurologic: No facial asymmetry. Normal gaze. Cranial nerves intact. Psychiatric: Affect appropriate. Mood euthymic. Thoughts logical. Speech volume and speed normal. No hallucinations. Well kempt. No visits with results within 3 Month(s) from this visit. Latest known visit with results is:   No results found for any previous visit. Assessment/ Plan:   Diagnoses and all orders for this visit:    1. DDD (degenerative disc disease), cervical  -     REFERRAL TO SPINE SURGERY    2. Skin lesion  -     REFERRAL TO DERMATOLOGY      Referred to specialist as requested to evaluate acute on chronic neck pain and skin lesion.   - Neck pain is likely chronic cervical stenosis vs muscle strain. Continue steroids as provided by urgent care  - Skin lesion is likely benign nevus but size enlarged and need skin biopsy to rule out malignancy. We discussed the expected course, resolution and complications of the diagnosis(es) in detail. Medication risks, benefits, costs, interactions, and alternatives were discussed as indicated. I advised her to contact the office if her condition worsens, changes or fails to improve as anticipated. She expressed understanding with the diagnosis(es) and plan. José Luis Webber is a 30 y.o. female being evaluated by a video visit encounter for concerns as above. A caregiver was present when appropriate. Due to this being a TeleHealth encounter (During [place] [address] public [facility] emergency), evaluation of the following organ systems was limited: Vitals/Constitutional/EENT/Resp/CV/GI//MS/Neuro/Skin/Heme-Lymph-Imm.   Pursuant to the emergency declaration under the [facility], [facility] waiver authority and the [employer] Act, this Virtual  Visit was conducted, with patient's (and/or legal guardian's) consent, to reduce the patient's risk of exposure to COVID-19 and provide necessary medical care. Services were provided through a video synchronous discussion virtually to substitute for in-person clinic visit. Provider was at home while conducting this encounter. Patient was at home during encounter. Other persons participating in call: None  Consent:  She and/or her healthcare decision maker is aware that this patient-initiated Telehealth encounter is a billable service, with coverage as determined by her insurance carrier. She is aware that she may receive a bill and has provided verbal consent to proceed: Yes  This virtual visit was conducted via [employer]. Pursuant to the emergency declaration under the [facility], [facility] waiver authority and the [employer] Act, this Virtual  Visit was conducted to reduce the patient's risk of exposure to COVID-19 and provide continuity of care for an established patient. Services were provided through a video synchronous discussion virtually to substitute for in-person clinic visit. Due to this being a TeleHealth evaluation, many elements of the physical examination are unable to be assessed. Total Time: minutes: 21-30 minutes. Educated patient on red flag symptoms to warrant return to clinic or emergency room visit. I have discussed the diagnosis with the patient and the intended plan as seen in the above orders. The patient has been offered or received an after-visit summary and questions were answered concerning future plans. I have discussed medication side effects and warnings with the patient as well. Follow-up and Dispositions    · Return if symptoms worsen or fail to improve.          Signed,    Tan Greenfield MD  11/6/2020

## 2020-11-06 NOTE — PROGRESS NOTES
Identified pt with two pt identifiers(name and ). Reviewed record in preparation for visit and have obtained necessary documentation. Chief Complaint   Patient presents with    Neck Pain     Sx for 7-8 days now      Pt is taking a steroid-- name starts with a C-- Pt does not remember the name of it. There are no preventive care reminders to display for this patient. There were no vitals taken for this visit. Coordination of Care Questionnaire:  :   1) Have you been to an emergency room, urgent care, or hospitalized since your last visit? If yes, where when, and reason for visit? Yes, two days ago, for neck pain, AFC on [address] and [address]       2. Have seen or consulted any other health care provider since your last visit? If yes, where when, and reason for visit? NO      Patient is accompanied by self I have received verbal consent from Edy Castillo to discuss any/all medical information while they are present in the room.

## 2020-11-08 NOTE — PATIENT INSTRUCTIONS
Neck: Exercises Introduction Here are some examples of exercises for you to try. The exercises may be suggested for a condition or for rehabilitation. Start each exercise slowly. Ease off the exercises if you start to have pain. You will be told when to start these exercises and which ones will work best for you. How to do the exercises Neck stretch 1. This stretch works best if you keep your shoulder down as you lean away from it. To help you remember to do this, start by relaxing your shoulders and lightly holding on to your thighs or your chair. 2. Tilt your head toward your shoulder and hold for 15 to 30 seconds. Let the weight of your head stretch your muscles. 3. If you would like a little added stretch, use your hand to gently and steadily pull your head toward your shoulder. For example, keeping your right shoulder down, lean your head to the left. 4. Repeat 2 to 4 times toward each shoulder. Diagonal neck stretch 1. Turn your head slightly toward the direction you will be stretching, and tilt your head diagonally toward your chest and hold for 15 to 30 seconds. 2. If you would like a little added stretch, use your hand to gently and steadily pull your head forward on the diagonal. 
3. Repeat 2 to 4 times toward each side. Dorsal glide stretch The dorsal glide stretches the back of the neck. If you feel pain, do not glide so far back. Some people find this exercise easier to do while lying on their backs with an ice pack on the neck. 1. Sit or stand tall and look straight ahead. 2. Slowly tuck your chin as you glide your head backward over your body 3. Hold for a count of 6, and then relax for up to 10 seconds. 4. Repeat 8 to 12 times. Chest and shoulder stretch 1. Sit or stand tall and glide your head backward as in the dorsal glide stretch. 2. Raise both arms so that your hands are next to your ears. 3. Take a deep breath, and as you breathe out, lower your elbows down and behind your back. You will feel your shoulder blades slide down and together, and at the same time you will feel a stretch across your chest and the front of your shoulders. 4. Hold for about 6 seconds, and then relax for up to 10 seconds. 5. Repeat 8 to 12 times. Strengthening: Hands on head 1. Move your head backward, forward, and side to side against gentle pressure from your hands, holding each position for about 6 seconds. 2. Repeat 8 to 12 times. Follow-up care is a key part of your treatment and safety. Be sure to make and go to all appointments, and call your doctor if you are having problems. It's also a good idea to know your test results and keep a list of the medicines you take. Where can you learn more? Go to http://www.salvador.com/ Enter P975 in the search box to learn more about \"Neck: Exercises. \" Current as of: March 2, 2020               Content Version: 12.6 © 2006-2020 [employer]. Care instructions adapted under license by [employer] (which disclaims liability or warranty for this information). If you have questions about a medical condition or this instruction, always ask your healthcare professional. [address] any warranty or liability for your use of this information.

## 2020-11-11 ENCOUNTER — VIRTUAL VISIT (OUTPATIENT)
Dept: FAMILY MEDICINE CLINIC | Age: 35
End: 2020-11-11
Payer: MEDICAID

## 2020-11-11 DIAGNOSIS — F41.0 GENERALIZED ANXIETY DISORDER WITH PANIC ATTACKS: Primary | ICD-10-CM

## 2020-11-11 DIAGNOSIS — F41.1 GENERALIZED ANXIETY DISORDER WITH PANIC ATTACKS: Primary | ICD-10-CM

## 2020-11-11 PROCEDURE — 90837 PSYTX W PT 60 MINUTES: CPT | Performed by: SOCIAL WORKER

## 2020-11-11 NOTE — PROGRESS NOTES
This note will not be viewable in [employer] for the following reason(s). This is a Psychotherapy Note. ([address], [facility] [address] Providers Only)   Virtual AT HOME follow up appt:  Met with Ms. Jorje Esquivel today. She reports she has had to take on another job due to being behind on her car payments and other bills. She is hoping that she only has to work these hours for a few months and then she will be caught up on her bills. She feels genesis in that her father has officially moved in as well as she lives with her sister and brother in law. She has lots of support for her kids and is not worried about their care. She is trying to take care of herself but admits that her anxiety and her sleep difficulties have made this transition more difficult. Ms. Jorje Esquivel reports that her sister suffers from an anxiety disorder and takes medication. She has been working the two jobs for a few weeks now. Ms. Jorje Esquivel reports that her anxiety is heightened when there are people around her and she is trying to focus on her task at hand. She will try and take a few minutes away from others and breathe and attempt to distract herself from her thoughts and symptoms. Additionally, this patient continues to experience sleep difficulties. She has a sleep routine, which sounds relaxing, quiet, occasionally reading, lights out, but finds herself opening her eyes and thinking. This clinician informed the patient to talk with her PCP about these issues in a future appt. She will follow up. We scheduled a follow up December 7, 2020, hoping that her PCP may prescribe her some medication to assist her with her symptoms and see how things are going after she has taken new medication.   Donald Valentino LCSW

## 2020-12-02 RX ORDER — AMITRIPTYLINE HYDROCHLORIDE 25 MG/1
25 TABLET, FILM COATED ORAL
COMMUNITY
End: 2021-03-17 | Stop reason: SDUPTHER

## 2020-12-02 RX ORDER — METHOCARBAMOL 500 MG/1
500 TABLET, FILM COATED ORAL 2 TIMES DAILY
COMMUNITY
End: 2020-12-21

## 2020-12-07 ENCOUNTER — VIRTUAL VISIT (OUTPATIENT)
Dept: FAMILY MEDICINE CLINIC | Age: 35
End: 2020-12-07
Payer: MEDICAID

## 2020-12-07 ENCOUNTER — HOSPITAL ENCOUNTER (OUTPATIENT)
Dept: PREADMISSION TESTING | Age: 35
Discharge: HOME | End: 2020-12-07
Payer: MEDICAID

## 2020-12-07 ENCOUNTER — TRANSCRIBE ORDER (OUTPATIENT)
Dept: REGISTRATION | Age: 35
End: 2020-12-07

## 2020-12-07 DIAGNOSIS — Z01.812 PRE-PROCEDURE LAB EXAM: ICD-10-CM

## 2020-12-07 DIAGNOSIS — Z01.812 PRE-PROCEDURE LAB EXAM: Primary | ICD-10-CM

## 2020-12-07 DIAGNOSIS — F41.1 GENERALIZED ANXIETY DISORDER WITH PANIC ATTACKS: Primary | ICD-10-CM

## 2020-12-07 DIAGNOSIS — F41.0 GENERALIZED ANXIETY DISORDER WITH PANIC ATTACKS: Primary | ICD-10-CM

## 2020-12-07 PROCEDURE — 90837 PSYTX W PT 60 MINUTES: CPT | Performed by: SOCIAL WORKER

## 2020-12-07 PROCEDURE — 87635 SARS-COV-2 COVID-19 AMP PRB: CPT

## 2020-12-07 NOTE — PROGRESS NOTES
This note will not be viewable in [employer] for the following reason(s). This is a Psychotherapy Note. ([address], [facility] [address] Providers Only)   Virtual AT HOME follow up appt:  Met with Ms. Romeo Gómez for follow up. She is continuing to work her two jobs. She is having an upper GI this Wednesday and is enjoying her two days prior to her GI to quarantine. We discussed relaxing and enjoying her children as the kids are starting to feel her absence because of her job schedule. She is relieved though that she is making some money to pay off her bills. Ms. Romeo Gómez reports that she called her PCP and wanted to get on medication after being instructed to meet with this clinician prior to her PCP's approval.  Now she is frustrated because her PCP wants to meet again before prescribing. As she is off today and tomorrow, this clinician instructed her to call and see if she could get an appt now. As she will be very busy between now and tanya, we have scheduled a follow up in January for next appt.   Rosa Bee LCSW

## 2020-12-08 LAB — SARS-COV-2, COV2NT: NOT DETECTED

## 2020-12-09 ENCOUNTER — ANESTHESIA EVENT (OUTPATIENT)
Dept: ENDOSCOPY | Age: 35
End: 2020-12-09
Payer: MEDICAID

## 2020-12-09 ENCOUNTER — ANESTHESIA (OUTPATIENT)
Dept: ENDOSCOPY | Age: 35
End: 2020-12-09
Payer: MEDICAID

## 2020-12-09 ENCOUNTER — HOSPITAL ENCOUNTER (OUTPATIENT)
Age: 35
Setting detail: OUTPATIENT SURGERY
Discharge: HOME | End: 2020-12-09
Attending: INTERNAL MEDICINE | Admitting: INTERNAL MEDICINE
Payer: MEDICAID

## 2020-12-09 VITALS
OXYGEN SATURATION: 97 % | RESPIRATION RATE: 17 BRPM | HEART RATE: 84 BPM | HEIGHT: 62 IN | WEIGHT: 178 LBS | SYSTOLIC BLOOD PRESSURE: 113 MMHG | DIASTOLIC BLOOD PRESSURE: 75 MMHG | BODY MASS INDEX: 32.76 KG/M2 | TEMPERATURE: 98.2 F

## 2020-12-09 LAB — HCG UR QL: NEGATIVE

## 2020-12-09 PROCEDURE — 88305 TISSUE EXAM BY PATHOLOGIST: CPT

## 2020-12-09 PROCEDURE — 74011250636 HC RX REV CODE- 250/636: Performed by: NURSE ANESTHETIST, CERTIFIED REGISTERED

## 2020-12-09 PROCEDURE — 2709999900 HC NON-CHARGEABLE SUPPLY: Performed by: INTERNAL MEDICINE

## 2020-12-09 PROCEDURE — 74011000250 HC RX REV CODE- 250: Performed by: NURSE ANESTHETIST, CERTIFIED REGISTERED

## 2020-12-09 PROCEDURE — 76060000031 HC ANESTHESIA FIRST 0.5 HR: Performed by: INTERNAL MEDICINE

## 2020-12-09 PROCEDURE — 76040000019: Performed by: INTERNAL MEDICINE

## 2020-12-09 PROCEDURE — 81025 URINE PREGNANCY TEST: CPT

## 2020-12-09 PROCEDURE — 77030021593 HC FCPS BIOP ENDOSC BSC -A: Performed by: INTERNAL MEDICINE

## 2020-12-09 RX ORDER — SODIUM CHLORIDE 9 MG/ML
INJECTION, SOLUTION INTRAVENOUS
Status: DISCONTINUED | OUTPATIENT
Start: 2020-12-09 | End: 2020-12-09 | Stop reason: HOSPADM

## 2020-12-09 RX ORDER — NALOXONE HYDROCHLORIDE 0.4 MG/ML
0.4 INJECTION, SOLUTION INTRAMUSCULAR; INTRAVENOUS; SUBCUTANEOUS
Status: DISCONTINUED | OUTPATIENT
Start: 2020-12-09 | End: 2020-12-09 | Stop reason: HOSPADM

## 2020-12-09 RX ORDER — PROPOFOL 10 MG/ML
INJECTION, EMULSION INTRAVENOUS AS NEEDED
Status: DISCONTINUED | OUTPATIENT
Start: 2020-12-09 | End: 2020-12-09 | Stop reason: HOSPADM

## 2020-12-09 RX ORDER — MIDAZOLAM HYDROCHLORIDE 1 MG/ML
.25-5 INJECTION, SOLUTION INTRAMUSCULAR; INTRAVENOUS
Status: DISCONTINUED | OUTPATIENT
Start: 2020-12-09 | End: 2020-12-09 | Stop reason: HOSPADM

## 2020-12-09 RX ORDER — SODIUM CHLORIDE 0.9 % (FLUSH) 0.9 %
5-40 SYRINGE (ML) INJECTION AS NEEDED
Status: DISCONTINUED | OUTPATIENT
Start: 2020-12-09 | End: 2020-12-09 | Stop reason: HOSPADM

## 2020-12-09 RX ORDER — EPINEPHRINE 0.1 MG/ML
1 INJECTION INTRACARDIAC; INTRAVENOUS
Status: DISCONTINUED | OUTPATIENT
Start: 2020-12-09 | End: 2020-12-09 | Stop reason: HOSPADM

## 2020-12-09 RX ORDER — DEXTROMETHORPHAN/PSEUDOEPHED 2.5-7.5/.8
1.2 DROPS ORAL
Status: DISCONTINUED | OUTPATIENT
Start: 2020-12-09 | End: 2020-12-09 | Stop reason: HOSPADM

## 2020-12-09 RX ORDER — SODIUM CHLORIDE 9 MG/ML
50 INJECTION, SOLUTION INTRAVENOUS CONTINUOUS
Status: DISCONTINUED | OUTPATIENT
Start: 2020-12-09 | End: 2020-12-09 | Stop reason: HOSPADM

## 2020-12-09 RX ORDER — LIDOCAINE HYDROCHLORIDE 20 MG/ML
INJECTION, SOLUTION EPIDURAL; INFILTRATION; INTRACAUDAL; PERINEURAL AS NEEDED
Status: DISCONTINUED | OUTPATIENT
Start: 2020-12-09 | End: 2020-12-09 | Stop reason: HOSPADM

## 2020-12-09 RX ORDER — ATROPINE SULFATE 0.1 MG/ML
0.5 INJECTION INTRAVENOUS
Status: DISCONTINUED | OUTPATIENT
Start: 2020-12-09 | End: 2020-12-09 | Stop reason: HOSPADM

## 2020-12-09 RX ORDER — SODIUM CHLORIDE 0.9 % (FLUSH) 0.9 %
5-40 SYRINGE (ML) INJECTION EVERY 8 HOURS
Status: DISCONTINUED | OUTPATIENT
Start: 2020-12-09 | End: 2020-12-09 | Stop reason: HOSPADM

## 2020-12-09 RX ORDER — FENTANYL CITRATE 50 UG/ML
25-200 INJECTION, SOLUTION INTRAMUSCULAR; INTRAVENOUS
Status: DISCONTINUED | OUTPATIENT
Start: 2020-12-09 | End: 2020-12-09 | Stop reason: HOSPADM

## 2020-12-09 RX ORDER — FLUMAZENIL 0.1 MG/ML
0.2 INJECTION INTRAVENOUS
Status: DISCONTINUED | OUTPATIENT
Start: 2020-12-09 | End: 2020-12-09 | Stop reason: HOSPADM

## 2020-12-09 RX ADMIN — PROPOFOL 50 MG: 10 INJECTION, EMULSION INTRAVENOUS at 15:04

## 2020-12-09 RX ADMIN — PROPOFOL 50 MG: 10 INJECTION, EMULSION INTRAVENOUS at 15:06

## 2020-12-09 RX ADMIN — PROPOFOL 50 MG: 10 INJECTION, EMULSION INTRAVENOUS at 15:05

## 2020-12-09 RX ADMIN — PROPOFOL 50 MG: 10 INJECTION, EMULSION INTRAVENOUS at 15:00

## 2020-12-09 RX ADMIN — LIDOCAINE HYDROCHLORIDE 40 MG: 20 INJECTION, SOLUTION EPIDURAL; INFILTRATION; INTRACAUDAL; PERINEURAL at 15:00

## 2020-12-09 RX ADMIN — PROPOFOL 50 MG: 10 INJECTION, EMULSION INTRAVENOUS at 15:01

## 2020-12-09 RX ADMIN — SODIUM CHLORIDE: 900 INJECTION, SOLUTION INTRAVENOUS at 14:40

## 2020-12-09 RX ADMIN — PROPOFOL 50 MG: 10 INJECTION, EMULSION INTRAVENOUS at 15:02

## 2020-12-09 RX ADMIN — PROPOFOL 50 MG: 10 INJECTION, EMULSION INTRAVENOUS at 15:03

## 2020-12-09 NOTE — ANESTHESIA PREPROCEDURE EVALUATION
Relevant Problems   No relevant active problems       Anesthetic History   No history of anesthetic complications            Review of Systems / Medical History  Patient summary reviewed, nursing notes reviewed and pertinent labs reviewed    Pulmonary            Asthma        Neuro/Psych   Within defined limits           Cardiovascular  Within defined limits                     GI/Hepatic/Renal     GERD           Endo/Other  Within defined limits           Other Findings              Physical Exam    Airway  Mallampati: II  TM Distance: > 6 cm  Neck ROM: normal range of motion   Mouth opening: Normal     Cardiovascular  Regular rate and rhythm,  S1 and S2 normal,  no murmur, click, rub, or gallop             Dental  No notable dental hx       Pulmonary  Breath sounds clear to auscultation               Abdominal  GI exam deferred       Other Findings            Anesthetic Plan    ASA: 2  Anesthesia type: MAC            Anesthetic plan and risks discussed with: Patient

## 2020-12-09 NOTE — H&P
The patient is a 30year old female who presents with a complaint of difficulty swallowing. The patient presents for worsening symptoms. The onset of the difficulty swallowing has been chronic and has been occurring for years. The symptoms have been associated with heartburn, while the symptoms have not been associated with abdominal discomfort, abdominal pain, anxiety, chest pain, depression, dryness of mouth, frequent pneumonia, halitosis, hiccups, hoarseness following dysphagia, ingestion of chemicals, long history of heartburn, loss of appetite, nasal regurgitation, neck pain, neck swelling, neurological disease, odynophagia, Raynaud's phenomenon, throat pain, tracheobronchial aspiration, vomiting, weight loss, wheezing, prior gastric bypass surgery, history of dementia, history of diabetes mellitus, history of esophageal stricture, history of endoscopic dilation, history of Nissen fundoplication, history of sclerotherapy, history of esophageal banding, history of radiation, history of achalasia, history of esophageal dysmotility, history of esophagectomy with gastric pull-up, history of esophagectomy with colonic interposition, history of neurological disease, history of connective tissue disease, Biggs's esophagus, eructation or other. Note for \"Difficulty swallowing \": she has h/o EOE for more than 5 years s/p multiple EGD with dialtation, last one 2 years ago in [place]. she c/o GERD sympotms despite taking protonix bid and dysphagia for solids, no food allergy testing done      Problem List/Past Medical (Aleyda Roper; 11/9/2020 1:49 PM)  EE (eosinophilic esophagitis) (460.53  K20.0)  [2016]: Anxiety (300.00  F41.9)    Asthma      Past Surgical History (Aleyda Roper; 11/9/2020 1:49 PM)  Cholecystectomy  [2011]:     Allergies (Aleyda Roper; 11/9/2020 1:49 PM)  No Known Drug Allergies  [10/28/2020]:  No Known Allergies  [10/28/2020]:    Medication History (Aleyda Roper; 11/9/2020 1:56 PM)  predniSONE  (5MG (21) Tab Ther Pack, 6 day therapy Oral 1 daily) Active. Tylenol/Codeine #1  (300-7.5MG Tablet, 1 Oral prn) Active. Flexeril  (5MG Tablet, 1 Oral daily) Active. Amitriptyline HCl  (25MG Tablet, Oral daily) Active. Pantoprazole Sodium  (40MG Tablet DR, Oral twice a day) Active. Medications Reconciled     Family History (Cammie Arguelles; 11/9/2020 1:49 PM)  Non-Contributory Family History      Social History (Cammie Arguelles; 11/9/2020 1:49 PM)  Blood Transfusion   No.  Alcohol Use   Occasional alcohol use, Drinks hard liquor. Employment status   Part-time. Marital status   Single. Tobacco Use   Never smoker. Diagnostic Studies History (Cammie Arguelles; 11/9/2020 1:49 PM)  Endoscopy  [2019]:    Health Maintenance History (Cammie Arguelles; 11/9/2020 1:49 PM)  Flu Vaccine   none  Pneumovax  [2015]:        Review of Systems (Cammie Arguelles; 11/9/2020 1:49 PM)  General Not Present- Chronic Fatigue, Poor Appetite, Weight Gain and Weight Loss. Skin Not Present- Itching, Rash and Skin Color Changes. HEENT Not Present- Hearing Loss and Vertigo. Respiratory Not Present- Difficulty Breathing and TB exposure. Cardiovascular Not Present- Chest Pain, Use of Antibiotics before Dental Procedures and Use of Blood Thinners. Gastrointestinal Present- See HPI. Musculoskeletal Not Present- Arthritis, Hip Replacement Surgery and Knee Replacement Surgery. Neurological Not Present- Weakness. Psychiatric Not Present- Depression. Endocrine Not Present- Diabetes and Thyroid Problems. Hematology Not Present- Anemia. Vitals (Cammie Arguelles; 11/9/2020 1:53 PM)  11/9/2020 1:49 PM  Weight: 184.06 lb   Height: 62 in   Body Surface Area: 1.85 m²   Body Mass Index: 33.67 kg/m²    Temp.: 97.6° F (Temporal)    Pulse: 84 (Regular)    Resp. : 16 (Unlabored)     BP: 118/78 (Sitting, Left Arm, Standard)              Physical Exam (René Whitfield MD; 11/9/2020 2:19 PM)  General  Mental Status - Alert. General Appearance - Cooperative, Pleasant, Not in acute distress. Orientation - Oriented X3. Build & Nutrition - Well nourished and Well developed. Integumentary  General Characteristics  Overall examination of the patient's skin reveals - no rashes, no bruises and no spider angiomas. Color - normal coloration of skin. Head and Neck  Neck  Global Assessment - full range of motion and supple, no bruit auscultated on the right, no bruit auscultated on the left, non-tender, no lymphadenopathy. Thyroid  Gland Characteristics - normal size and consistency. Eye  Eyeball - Left - No Exophthalmos. Eyeball - Right - No Exophthalmos. Sclera/Conjunctiva - Left - No Jaundice. Sclera/Conjunctiva - Right - No Jaundice. Chest and Lung Exam  Chest and lung exam reveals  - quiet, even and easy respiratory effort with no use of accessory muscles. Auscultation  Breath sounds - Normal. Adventitious sounds - No Adventitious sounds. Cardiovascular  Auscultation  Rhythm - Regular, No Tachycardia, No Bradycardia . Heart Sounds - Normal heart sounds , S1 WNL and S2 WNL, No S3, No Summation Gallop. Murmurs & Other Heart Sounds - Auscultation of the heart reveals - No Murmurs. Abdomen  Palpation/Percussion  Tenderness - Non-Tender. Rebound tenderness - No rebound. Rigidity (guarding) - No Rigidity. Dullness to percussion - No abnormal dullness to percussion. Liver - No hepatosplenomegaly. Abdominal Mass Palpable - No masses. Other Characteristics - No Ascites. Auscultation  Auscultation of the abdomen reveals - Bowel sounds normal, No Abdominal bruits and No Succussion splash. Rectal - Did not examine. Peripheral Vascular  Upper Extremity  Inspection - Left - Normal - No Clubbing, No Cyanosis, No Edema, Pulses Intact. Right - Normal - No Clubbing, No Cyanosis, No Edema, Pulses Intact. Palpation - Edema - Left - No edema. Right - No edema.   Lower Extremity  Inspection - Left - Inspection Normal. Right - Inspection Normal. Palpation - Edema - Left - No edema. Right - No edema. Neurologic  Neurologic evaluation reveals  - Cranial nerves grossly intact, no focal neurologic deficits. Motor  Involuntary Movements - Asterixis - not present. Musculoskeletal  Global Assessment  Gait and Station - normal gait and station. Assessment & Plan Yane Suh MD; 11/9/2020 2:20 PM)  Dysphagia (787.20  R13.10)  Impression: she has h/o EOE for more than 5 years s/p multiple EGD with dilatation, last one 2 years ago in [place]. she c/o GERD symptoms despite taking Protonix bid and dysphagia for solids, no food allergy testing done  EGD for esophageal bx and possible dilatation  Current Plans  Endoscopy (72834) (Discussed risks and benefits with the patient to include: perforation, post polypectomy, or post biopsy bleeding, missed lesions, and sedation reactions.)  Pt Education - How to access health information online: discussed with patient and provided information. EE (eosinophilic esophagitis) (609.35  K20.0)  Heartburn (787.1  R12)  Impression: add pepcid 20 mg po qhs  Current Plans  Pt Education - GERD-Reflux Esophagitis: discussed with patient and provided information. Date of Surgery Update:  Sandy Castano was seen and examined. History and physical has been reviewed. The patient has been examined.  There have been no significant clinical changes since the completion of the originally dated History and Physical.    Signed By: Arlene Mendosa MD     December 9, 2020 2:54 PM

## 2020-12-09 NOTE — ANESTHESIA POSTPROCEDURE EVALUATION
Procedure(s):  ESOPHAGOGASTRODUODENOSCOPY (EGD) :-  ESOPHAGOGASTRODUODENAL (EGD) BIOPSY. MAC    Anesthesia Post Evaluation        Patient location during evaluation: PACU  Patient participation: complete - patient participated  Level of consciousness: awake  Pain management: adequate  Airway patency: patent  Anesthetic complications: no  Cardiovascular status: hemodynamically stable  Respiratory status: acceptable  Hydration status: acceptable  Comments: I have seen and evaluated the patient. The patient is ready for PACU discharge. Mague Acosta, DO                         INITIAL Post-op Vital signs: No vitals data found for the desired time range.
normal...

## 2020-12-09 NOTE — DISCHARGE INSTRUCTIONS
Patient Education        Esophagitis: Care Instructions  Your Care Instructions     Esophagitis (say \"ih-sof-uh-JY-tus\") is irritation of the esophagus, the tube that carries food from your throat to your stomach. Acid reflux is the most common cause of this condition. When you have reflux, stomach acid and juices flow upward. This can cause pain or a burning feeling in your chest. You may have a sore throat. It may be hard to swallow. Other causes of this condition include some medicines and supplements. Allergies or an infection can also cause it. Your doctor will ask about your symptoms and past health. He or she might do tests to find the cause of your symptoms. Treatment depends on what is causing the problem. Treatment might include changing your diet or taking medicine to relieve your symptoms. It might also include changing a medicine that is causing your symptoms. If you have reflux, medicine that reduces the stomach acid helps your body heal. It might take 1 to 3 weeks to heal.  Follow-up care is a key part of your treatment and safety. Be sure to make and go to all appointments, and call your doctor if you are having problems. It's also a good idea to know your test results and keep a list of the medicines you take. How can you care for yourself at home? · If you have acid reflux, your doctor may recommend that you:  ? Eat several small meals instead of two or three large meals. After you eat, wait 2 to 3 hours before you lie down. ? Avoid chocolate, mint, alcohol, and spicy foods. ? Don't smoke or use smokeless tobacco. Smoking can make this condition worse. If you need help quitting, talk to your doctor about stop-smoking programs and medicines. These can increase your chances of quitting for good. ? Raise the head of your bed 6 to 8 inches if you have symptoms at night. ? Lose weight if you are overweight. ? Take an over-the-counter antacid, such as Maalox, Mylanta, or Tums.  Be careful when you take over-the-counter antacid medicines. Many of these medicines have aspirin in them. Read the label to make sure that you are not taking more than the recommended dose. Too much aspirin can be harmful. ? Take stronger acid reducers. Examples are famotidine (such as Pepcid) and omeprazole (such as Prilosec). · If your condition is caused by infection, allergy, or other problems, use the medicine or treatments that your doctor recommends. · Be safe with medicines. Take your medicines exactly as prescribed. Call your doctor if you think you are having a problem with your medicine. When should you call for help? Call your doctor now or seek immediate medical care if:    · You have new or worse belly pain.     · You are vomiting. Watch closely for changes in your health, and be sure to contact your doctor if:    · You have new or worse symptoms of reflux.     · You have trouble or pain swallowing.     · You are losing weight.     · You do not get better as expected. Where can you learn more? Go to http://www.gray.com/  Enter N977 in the search box to learn more about \"Esophagitis: Care Instructions. \"  Current as of: April 15, 2020               Content Version: 12.6  © 4142-6051 [employer]. Care instructions adapted under license by [employer] (which disclaims liability or warranty for this information). If you have questions about a medical condition or this instruction, always ask your healthcare professional. [place] [address] any warranty or liability for your use of this information.        [address], [address] [facility]    EGD DISCHARGE INSTRUCTIONS    Rita Santos  [id number]  1985    Discomfort:  Sore throat- throat lozenges or warm salt water gargle  redness at IV site- apply warm compress to area; if redness or soreness persist- contact your physician  Gaseous discomfort- walking, belching will help relieve any discomfort  You may not operate a vehicle for 12 hours  You may not engage in an occupation involving machinery or appliances for rest of today  You may not drink alcoholic beverages for at least 12 hours  Avoid making any critical decisions for at least 24 hour  DIET  You may resume your regular diet - however -  remember your colon is empty and a heavy meal will produce gas. Avoid these foods:  vegetables, fried / greasy foods, carbonated drinks    ACTIVITY  You may resume your normal daily activities   Spend the remainder of the day resting -  avoid any strenuous activity. CALL M.D. ANY SIGN OF   Increasing pain, nausea, vomiting  Abdominal distension (swelling)  New increased bleeding (oral or rectal)  Fever (chills)  Pain in chest area  Bloody discharge from nose or mouth  Shortness of breath    Follow-up Instructions:   Call Dr. Ngozi Martinez for any questions or problems and follow up with him in 1 month  Telephone # [phone]  Increase pepcid to 40 mg po QHS, new prescription sent to your pharmacy  Start taking carafate , three times daily, new prescription sent to your pharmacy    ENDOSCOPY FINDINGS:   Your endoscopy showed hiatal hernia, inflammation from acid reflux ( esophagitis), biopsies taken    Signed By: Ngozi Martinez MD     12/9/2020  3:33 PM         Learning About Coronavirus (COVID-19)  Coronavirus (COVID-19): Overview  What is coronavirus ([id number]-[id number])? The coronavirus disease (COVID-19) is caused by a virus. It is an illness that was first found in [place], [place], in December 2019. It has since spread worldwide. The virus can cause fever, cough, and trouble breathing. In severe cases, it can cause pneumonia and make it hard to breathe without help. It can cause death. Coronaviruses are a large group of viruses. They cause the common cold.  They also cause more serious illnesses like Middle East respiratory syndrome (MERS) and severe acute respiratory syndrome (SARS). COVID-19 is caused by a novel coronavirus. That means it's a new type that has not been seen in people before. This virus spreads person-to-person through droplets from coughing and sneezing. It can also spread when you are close to someone who is infected. And it can spread when you touch something that has the virus on it, such as a doorknob or a tabletop. What can you do to protect yourself from coronavirus (COVID-19)? The best way to protect yourself from getting sick is to:  · Avoid areas where there is an outbreak. · Avoid contact with people who may be infected. · Wash your hands often with soap or alcohol-based hand sanitizers. · Avoid crowds and try to stay at least 6 feet away from other people. · Wash your hands often, especially after you cough or sneeze. Use soap and water, and scrub for at least 20 seconds. If soap and water aren't available, use an alcohol-based hand . · Avoid touching your mouth, nose, and eyes. What can you do to avoid spreading the virus to others? To help avoid spreading the virus to others:  · Cover your mouth with a tissue when you cough or sneeze. Then throw the tissue in the trash. · Use a disinfectant to clean things that you touch often. · Stay home if you are sick or have been exposed to the virus. Don't go to school, work, or public areas. And don't use public transportation. · If you are sick:  ? Leave your home only if you need to get medical care. But call the doctor's office first so they know you're coming. And wear a face mask, if you have one.  ? If you have a face mask, wear it whenever you're around other people. It can help stop the spread of the virus when you cough or sneeze. ? Clean and disinfect your home every day. Use household  and disinfectant wipes or sprays. Take special care to clean things that you grab with your hands.  These include doorknobs, remote controls, phones, and handles on your refrigerator and microwave. And don't forget countertops, tabletops, bathrooms, and computer keyboards. When to call for help  Call 911 anytime you think you may need emergency care. For example, call if:  · You have severe trouble breathing. (You can't talk at all.)  · You have constant chest pain or pressure. · You are severely dizzy or lightheaded. · You are confused or can't think clearly. · Your face and lips have a blue color. · You pass out (lose consciousness) or are very hard to wake up. Call your doctor now if you develop symptoms such as:  · Shortness of breath. · Fever. · Cough. If you need to get care, call ahead to the doctor's office for instructions before you go. Make sure you wear a face mask, if you have one, to prevent exposing other people to the virus. Where can you get the latest information? The following health organizations are tracking and studying this virus. Their websites contain the most up-to-date information. Nolan Apo also learn what to do if you think you may have been exposed to the virus. · U.S. Centers for Disease Control and Prevention (CDC): The CDC provides updated news about the disease and travel advice. The website also tells you how to prevent the spread of infection. www.cdc.gov  · World Health Organization [facility]): WHO offers information about the virus outbreaks. WHO also has travel advice. www.who.int  Current as of: April 1, 2020               Content Version: 12.4  © 2006-2020 [employer]. Care instructions adapted under license by your healthcare professional. If you have questions about a medical condition or this instruction, always ask your healthcare professional. [address] any warranty or liability for your use of this information.

## 2020-12-09 NOTE — PROCEDURES
[address]   Heidi Elizabeth, [address] [facility] (EGD) Procedure Note    Dois Severs  1985  [id number]      Procedure: Endoscopic Gastroduodenoscopy with biopsy    Indication:  Dysphagia/odynophagia, GERD     Pre-operative Diagnosis: see indication above    Post-operative Diagnosis: see findings below    : Lore John MD    Surgical Assistant: Endoscopy Technician-1: Geovanni Hood IV  Endoscopy RN-1: Danny Sandhu RN    Implants:  None    Referring Provider:  Nisha Candelario MD      Anesthesia/Sedation:  MAC anesthesia Propofol        Procedure Details     After infomed consent was obtained for the procedure, with all risks and benefits of procedure explained the patient was taken to the endoscopy suite and placed in the left lateral decubitus position. Following sequential administration of sedation as per above, the endoscope was inserted into the mouth and advanced under direct vision to third portion of the duodenum. A careful inspection was made as the gastroscope was withdrawn, including a retroflexed view of the proximal stomach; findings and interventions are described below. Findings:   Esophagus:hiatal hernia 5 cm in size     Grade 3 erosive esophagitis at G-E junction, consistent with acid reflux esophagitis    Rest of esophagus showed diffuse changes ( furrows and white specks)  highly suggestive of eosinophilic esophagitis, multiple biopsies taken  No esophageal stricture, no webs seen    Stomach: normal   Duodenum/jejunum: normal      Therapies:  none    Specimens: as above         EBL: None      Complications:   None; patient tolerated the procedure well. Impression:    -See post-procedure diagnoses.     Recommendations:  -Continue acid suppression with PPI BID  -increase pepcid to 40 mg po qhs  -add carafate 10 ml po tid  -f/u on the biopsies from today, will refer to allergist if it is positive for EOE  -f/u in 1 month    Signed By: Virginia Kumari MD     12/9/2020  3:25 PM

## 2020-12-09 NOTE — ROUTINE PROCESS
Edward Marcus 1985 
[id number] Situation: 
Verbal report received from: opal Procedure: Procedure(s): ESOPHAGOGASTRODUODENOSCOPY (EGD) :- 
ESOPHAGOGASTRODUODENAL (EGD) BIOPSY Background: 
 
Preoperative diagnosis: DYSPHAGIA 
ACID REFLUX Postoperative diagnosis: hiatalmhernia, esophagitis :  Dr. Jessie Naranjo Assistant(s): Endoscopy Technician-1: Jeannette Foster IV 
Endoscopy RN-1: Eric Riggs RN Specimens:  
ID Type Source Tests Collected by Time Destination 1 : random esophagus Preservative   Nabila Shepard MD 12/9/2020 [address] [place] Assessment: 
Intra-procedure medications Anesthesia gave intra-procedure sedation and medications, see anesthesia flow sheet yes Intravenous fluids: NS@ Joretta Aidan Vital signs stable Abdominal assessment: round and soft Recommendation: 
Discharge patient per MD order Return to floor Family or Friend yes Permission to share finding with family or friend yes

## 2020-12-17 ENCOUNTER — HOSPITAL ENCOUNTER (OUTPATIENT)
Age: 35
Setting detail: OBSERVATION
Discharge: HOME | End: 2020-12-21
Attending: EMERGENCY MEDICINE | Admitting: FAMILY MEDICINE
Payer: MEDICAID

## 2020-12-17 ENCOUNTER — APPOINTMENT (OUTPATIENT)
Dept: MRI IMAGING | Age: 35
End: 2020-12-17
Attending: NURSE PRACTITIONER
Payer: MEDICAID

## 2020-12-17 ENCOUNTER — APPOINTMENT (OUTPATIENT)
Dept: GENERAL RADIOLOGY | Age: 35
End: 2020-12-17
Attending: NURSE PRACTITIONER
Payer: MEDICAID

## 2020-12-17 DIAGNOSIS — M48.02 SPINAL STENOSIS OF CERVICAL REGION: Primary | ICD-10-CM

## 2020-12-17 DIAGNOSIS — M79.2 INTRACTABLE CERVICAL NEUROPATHIC PAIN: ICD-10-CM

## 2020-12-17 PROCEDURE — 99282 EMERGENCY DEPT VISIT SF MDM: CPT

## 2020-12-17 PROCEDURE — 96374 THER/PROPH/DIAG INJ IV PUSH: CPT

## 2020-12-17 PROCEDURE — 72141 MRI NECK SPINE W/O DYE: CPT

## 2020-12-17 PROCEDURE — 96375 TX/PRO/DX INJ NEW DRUG ADDON: CPT

## 2020-12-17 PROCEDURE — 74011250636 HC RX REV CODE- 250/636: Performed by: NURSE PRACTITIONER

## 2020-12-17 PROCEDURE — 73030 X-RAY EXAM OF SHOULDER: CPT

## 2020-12-17 RX ORDER — KETOROLAC TROMETHAMINE 30 MG/ML
15 INJECTION, SOLUTION INTRAMUSCULAR; INTRAVENOUS
Status: COMPLETED | OUTPATIENT
Start: 2020-12-17 | End: 2020-12-17

## 2020-12-17 RX ORDER — HYDROMORPHONE HYDROCHLORIDE 1 MG/ML
1 INJECTION, SOLUTION INTRAMUSCULAR; INTRAVENOUS; SUBCUTANEOUS ONCE
Status: COMPLETED | OUTPATIENT
Start: 2020-12-17 | End: 2020-12-17

## 2020-12-17 RX ORDER — DIAZEPAM 10 MG/2ML
5 INJECTION INTRAMUSCULAR
Status: COMPLETED | OUTPATIENT
Start: 2020-12-17 | End: 2020-12-17

## 2020-12-17 RX ORDER — MORPHINE SULFATE 4 MG/ML
4 INJECTION INTRAVENOUS ONCE
Status: COMPLETED | OUTPATIENT
Start: 2020-12-17 | End: 2020-12-17

## 2020-12-17 RX ADMIN — HYDROMORPHONE HYDROCHLORIDE 1 MG: 1 INJECTION, SOLUTION INTRAMUSCULAR; INTRAVENOUS; SUBCUTANEOUS at 23:41

## 2020-12-17 RX ADMIN — DIAZEPAM 5 MG: 5 INJECTION, SOLUTION INTRAMUSCULAR; INTRAVENOUS at 21:26

## 2020-12-17 RX ADMIN — MORPHINE SULFATE 4 MG: 4 INJECTION INTRAVENOUS at 21:25

## 2020-12-17 RX ADMIN — KETOROLAC TROMETHAMINE 15 MG: 30 INJECTION, SOLUTION INTRAMUSCULAR at 21:25

## 2020-12-18 ENCOUNTER — APPOINTMENT (OUTPATIENT)
Dept: CT IMAGING | Age: 35
End: 2020-12-18
Attending: NEUROLOGICAL SURGERY
Payer: MEDICAID

## 2020-12-18 PROBLEM — R52 PAIN: Status: ACTIVE | Noted: 2020-12-18

## 2020-12-18 LAB
ANION GAP SERPL CALC-SCNC: 5 MMOL/L (ref 5–15)
BASOPHILS # BLD: 0 K/UL (ref 0–0.1)
BASOPHILS NFR BLD: 0 % (ref 0–1)
BUN SERPL-MCNC: 13 MG/DL (ref 6–20)
BUN/CREAT SERPL: 15 (ref 12–20)
CALCIUM SERPL-MCNC: 8.6 MG/DL (ref 8.5–10.1)
CHLORIDE SERPL-SCNC: 105 MMOL/L (ref 97–108)
CO2 SERPL-SCNC: 24 MMOL/L (ref 21–32)
CREAT SERPL-MCNC: 0.89 MG/DL (ref 0.55–1.02)
DIFFERENTIAL METHOD BLD: ABNORMAL
EOSINOPHIL # BLD: 0 K/UL (ref 0–0.4)
EOSINOPHIL NFR BLD: 0 % (ref 0–7)
ERYTHROCYTE [DISTWIDTH] IN BLOOD BY AUTOMATED COUNT: 13.2 % (ref 11.5–14.5)
GLUCOSE SERPL-MCNC: 169 MG/DL (ref 65–100)
HCT VFR BLD AUTO: 41 % (ref 35–47)
HGB BLD-MCNC: 13.6 G/DL (ref 11.5–16)
IMM GRANULOCYTES # BLD AUTO: 0 K/UL (ref 0–0.04)
IMM GRANULOCYTES NFR BLD AUTO: 0 % (ref 0–0.5)
LYMPHOCYTES # BLD: 0.8 K/UL (ref 0.8–3.5)
LYMPHOCYTES NFR BLD: 7 % (ref 12–49)
MCH RBC QN AUTO: 27.3 PG (ref 26–34)
MCHC RBC AUTO-ENTMCNC: 33.2 G/DL (ref 30–36.5)
MCV RBC AUTO: 82.2 FL (ref 80–99)
MONOCYTES # BLD: 0 K/UL (ref 0–1)
MONOCYTES NFR BLD: 0 % (ref 5–13)
NEUTS SEG # BLD: 11 K/UL (ref 1.8–8)
NEUTS SEG NFR BLD: 93 % (ref 32–75)
NRBC # BLD: 0 K/UL (ref 0–0.01)
NRBC BLD-RTO: 0 PER 100 WBC
PLATELET # BLD AUTO: 417 K/UL (ref 150–400)
PMV BLD AUTO: 8.8 FL (ref 8.9–12.9)
POTASSIUM SERPL-SCNC: 4.2 MMOL/L (ref 3.5–5.1)
RBC # BLD AUTO: 4.99 M/UL (ref 3.8–5.2)
RBC MORPH BLD: ABNORMAL
SODIUM SERPL-SCNC: 134 MMOL/L (ref 136–145)
WBC # BLD AUTO: 11.8 K/UL (ref 3.6–11)

## 2020-12-18 PROCEDURE — 80048 BASIC METABOLIC PNL TOTAL CA: CPT

## 2020-12-18 PROCEDURE — 72125 CT NECK SPINE W/O DYE: CPT

## 2020-12-18 PROCEDURE — 74011250637 HC RX REV CODE- 250/637: Performed by: NEUROLOGICAL SURGERY

## 2020-12-18 PROCEDURE — 99218 HC RM OBSERVATION: CPT

## 2020-12-18 PROCEDURE — 74011250637 HC RX REV CODE- 250/637: Performed by: NURSE PRACTITIONER

## 2020-12-18 PROCEDURE — 74011250636 HC RX REV CODE- 250/636: Performed by: NEUROLOGICAL SURGERY

## 2020-12-18 PROCEDURE — 74011250636 HC RX REV CODE- 250/636: Performed by: FAMILY MEDICINE

## 2020-12-18 PROCEDURE — 74011250636 HC RX REV CODE- 250/636: Performed by: INTERNAL MEDICINE

## 2020-12-18 PROCEDURE — 96375 TX/PRO/DX INJ NEW DRUG ADDON: CPT

## 2020-12-18 PROCEDURE — 96376 TX/PRO/DX INJ SAME DRUG ADON: CPT

## 2020-12-18 PROCEDURE — 94760 N-INVAS EAR/PLS OXIMETRY 1: CPT

## 2020-12-18 PROCEDURE — 85025 COMPLETE CBC W/AUTO DIFF WBC: CPT

## 2020-12-18 RX ORDER — MORPHINE SULFATE 2 MG/ML
2 INJECTION, SOLUTION INTRAMUSCULAR; INTRAVENOUS
Status: DISCONTINUED | OUTPATIENT
Start: 2020-12-18 | End: 2020-12-21 | Stop reason: HOSPADM

## 2020-12-18 RX ORDER — KETOROLAC TROMETHAMINE 30 MG/ML
15 INJECTION, SOLUTION INTRAMUSCULAR; INTRAVENOUS
Status: DISCONTINUED | OUTPATIENT
Start: 2020-12-18 | End: 2020-12-21 | Stop reason: HOSPADM

## 2020-12-18 RX ORDER — ACETAMINOPHEN 650 MG/1
650 SUPPOSITORY RECTAL
Status: DISCONTINUED | OUTPATIENT
Start: 2020-12-18 | End: 2020-12-21 | Stop reason: HOSPADM

## 2020-12-18 RX ORDER — PANTOPRAZOLE SODIUM 40 MG/1
40 TABLET, DELAYED RELEASE ORAL EVERY 24 HOURS
Status: DISCONTINUED | OUTPATIENT
Start: 2020-12-18 | End: 2020-12-19 | Stop reason: SDUPTHER

## 2020-12-18 RX ORDER — SODIUM CHLORIDE 0.9 % (FLUSH) 0.9 %
5-40 SYRINGE (ML) INJECTION EVERY 8 HOURS
Status: DISCONTINUED | OUTPATIENT
Start: 2020-12-18 | End: 2020-12-21 | Stop reason: HOSPADM

## 2020-12-18 RX ORDER — SODIUM CHLORIDE 0.9 % (FLUSH) 0.9 %
5-40 SYRINGE (ML) INJECTION AS NEEDED
Status: DISCONTINUED | OUTPATIENT
Start: 2020-12-18 | End: 2020-12-21 | Stop reason: HOSPADM

## 2020-12-18 RX ORDER — OXYCODONE HYDROCHLORIDE 5 MG/1
10 TABLET ORAL
Status: DISCONTINUED | OUTPATIENT
Start: 2020-12-18 | End: 2020-12-21 | Stop reason: HOSPADM

## 2020-12-18 RX ORDER — ACETAMINOPHEN 325 MG/1
650 TABLET ORAL
Status: DISCONTINUED | OUTPATIENT
Start: 2020-12-18 | End: 2020-12-21 | Stop reason: HOSPADM

## 2020-12-18 RX ORDER — CYCLOBENZAPRINE HCL 10 MG
10 TABLET ORAL
Status: DISCONTINUED | OUTPATIENT
Start: 2020-12-18 | End: 2020-12-21 | Stop reason: HOSPADM

## 2020-12-18 RX ORDER — DEXAMETHASONE SODIUM PHOSPHATE 4 MG/ML
4 INJECTION, SOLUTION INTRA-ARTICULAR; INTRALESIONAL; INTRAMUSCULAR; INTRAVENOUS; SOFT TISSUE EVERY 8 HOURS
Status: DISCONTINUED | OUTPATIENT
Start: 2020-12-18 | End: 2020-12-20

## 2020-12-18 RX ADMIN — Medication 10 ML: at 13:06

## 2020-12-18 RX ADMIN — OXYCODONE 10 MG: 5 TABLET ORAL at 13:02

## 2020-12-18 RX ADMIN — DEXAMETHASONE SODIUM PHOSPHATE 4 MG: 4 INJECTION, SOLUTION INTRA-ARTICULAR; INTRALESIONAL; INTRAMUSCULAR; INTRAVENOUS; SOFT TISSUE at 13:02

## 2020-12-18 RX ADMIN — PANTOPRAZOLE SODIUM 40 MG: 40 TABLET, DELAYED RELEASE ORAL at 13:02

## 2020-12-18 RX ADMIN — KETOROLAC TROMETHAMINE 15 MG: 30 INJECTION, SOLUTION INTRAMUSCULAR at 07:19

## 2020-12-18 RX ADMIN — CYCLOBENZAPRINE 10 MG: 10 TABLET, FILM COATED ORAL at 19:27

## 2020-12-18 RX ADMIN — MORPHINE SULFATE 2 MG: 2 INJECTION, SOLUTION INTRAMUSCULAR; INTRAVENOUS at 10:05

## 2020-12-18 RX ADMIN — DEXAMETHASONE SODIUM PHOSPHATE 4 MG: 4 INJECTION, SOLUTION INTRA-ARTICULAR; INTRALESIONAL; INTRAMUSCULAR; INTRAVENOUS; SOFT TISSUE at 21:32

## 2020-12-18 RX ADMIN — METHYLPREDNISOLONE SODIUM SUCCINATE 30 MG: 40 INJECTION, POWDER, FOR SOLUTION INTRAMUSCULAR; INTRAVENOUS at 07:45

## 2020-12-18 RX ADMIN — Medication 10 ML: at 21:32

## 2020-12-18 RX ADMIN — KETOROLAC TROMETHAMINE 15 MG: 30 INJECTION, SOLUTION INTRAMUSCULAR at 02:00

## 2020-12-18 RX ADMIN — METHYLPREDNISOLONE SODIUM SUCCINATE 30 MG: 40 INJECTION, POWDER, FOR SOLUTION INTRAMUSCULAR; INTRAVENOUS at 09:03

## 2020-12-18 RX ADMIN — OXYCODONE 10 MG: 5 TABLET ORAL at 17:35

## 2020-12-18 NOTE — CONSULTS
[address]    Name:  Rafael Ritter  MR#:  [id number]  :  1985  ACCOUNT #:  [de-identified]  DATE OF SERVICE:  2020      NEUROSURGERY CONSULTATION    REASON FOR CONSULTATION:  Herniated nucleus pulposus, cervical spine; cervical radiculopathy. HISTORY OF PRESENT ILLNESS:  This is a 61-year-old woman, who underwent an ACDF of C6-7 in [place] in . At that time, she had right upper extremity numbness and weakness. She did well after surgery, no difficulties, although they did give her Ativan and she says she had a bad reaction to that. She has had intermittent episodes of neck pain and upper extremity pain, but it became severe two days ago. She went to [facility], and they gave her medications. She then came to [facility] Emergency Room early this morning secondary to pain. She had an MRI that shows a large disk herniation rostral to the level of her previous surgery. She was admitted for pain control. PAST MEDICAL HISTORY:  1. Anxiety. 2.  Asthma. 3.  Eosinophilic esophagitis. PAST SURGICAL HISTORY:  1. Cholecystectomy. 2.  ACDF 2015 in [place]. MEDICATIONS PRIOR TO ADMISSION:  1. Robaxin 500 mg twice a day. 2.  Elavil 25 mg q.h.s.  3.  Protonix 40 mg q. day. ALLERGIES:  ATIVAN. FAMILY HISTORY:  Positive for cancer and depression. SOCIAL HISTORY:  She is single. She works at [employer]. She denies tobacco or alcohol use. REVIEW OF SYSTEMS:  Denies any headache, vision changes, hearing difficulty, chest pain, shortness of breath, abdominal pain, urinary dysfunction. Positive for numbness in her left upper extremity. Positive for paresthesias in her left upper extremity. No skin eruption. PHYSICAL EXAMINATION:  GENERAL:  A well-developed, well-nourished woman, who is in mild discomfort. VITAL SIGNS:  Temperature 97.8, blood pressure 105/78, pulse 87. NEUROLOGIC:  She is alert. She is oriented. Normal affect. Fluent speech. Conjugate gaze. Symmetric face. Full strength in her right upper extremity, deltoid, biceps, triceps, wrist extensor, hand intrinsic. In her left upper extremity, she has full strength in her deltoid. She has significant pain with examination. She has 4/5 in her biceps, brachioradialis, wrist extensor. Good strength in her triceps and hand intrinsics. Severe pain down to her arm, into her forearm, and thumb and index finger. Good strength in her lower extremities. Her previous incision is well healed. IMAGING STUDIES:  She has an MRI of the cervical spine that was performed yesterday evening at 10 p.m. that shows a large disk herniation to the left at C5-6 causing severe left foraminal stenosis and significant cord compression. Her previous fusion is from C6-7. ASSESSMENT AND PLAN:  This is a 80-year-old woman with a previous fusion at C6-C7. She now has a large disk herniation at C5-6 with angulation of the spinal column. We will get a cervical CT to evaluate her instrumentation. We will avoid all anticoagulation. She will likely need surgery for this. Further recommendations depending on her CT. Thank you for this consultation.       MD ROCKY Cote/[email or web address]  D:  12/18/2020 11:18  T:  12/18/2020 13:41  JOB #:  [id number]

## 2020-12-18 NOTE — PROGRESS NOTES
TRANSFER - IN REPORT:    Verbal report received from Wily Hooker RN (name) on Kori Summers  being received from ED (unit) for routine progression of care      Report consisted of patients Situation, Background, Assessment and   Recommendations(SBAR). Information from the following report(s) SBAR, ED Summary and MAR was reviewed with the receiving nurse. Opportunity for questions and clarification was provided. Assessment completed upon patients arrival to unit and care assumed.

## 2020-12-18 NOTE — PROGRESS NOTES
Hospitalist Progress Note  Mary Lou Frias MD  Answering service: [phone] from in house phone      Date of Service:  2020  NAME:  Jolene Cao  :  1985  MRN:  [id number]      Admission Summary:   Jolene Cao is a 30 y.o. female with a past medical history of C5-C6 cervical fusion in [place]. In , anxiety, asthma, and eosinophilic esophagitis who presents with left-sided neck pain with radiation down her left arm. Evaluated in the ED, and reportedly underwent imaging that was normal.  She was treated with Toradol, Valium, and steroids with no relief. He denies any acute injuries, or change in physical activity. She denies upper or lower extremity paresthesias, or weakness.     In the ED, shoulder x-ray was normal.  Cervical spine MRI showed mild canal, and severe left foraminal stenosis at C5-C6. Dr. Mark Bella from neurosurgery was consulted, and recommended steroids with pain control, and will see patient as a consultation. Interval history / Subjective:      Patient seen and examined this morning. Patient is complaining of severe pain on the neck radiating to the left arm and her oral pain medication not controlling it. Assessment & Plan:     # Left C5-C6 severe left subarticular stenosis with cervical radicular pain  -hx C5-C6 fusion in  in [place]. - CT Left lateral and subarticular disc herniation at C5-6 with severe left  subarticular stenosis.   - C-spine MRI with mild canal, and severe left foraminal stenosis  - Failed outpatient therapy with steroids, and NSAIDs.  - on IV steroid, IV pain medication   - Neurosurgery following        Code: full  MPOA: Kallie Rome [phone]  dvt ppx: SCD     FUNCTIONAL STATUS PRIOR TO HOSPITALIZATION Ambulates Independently      Hospital Problems  Date Reviewed: 2020          Codes Class Noted POA    Pain ICD-10-CM: R52  ICD-9-CM: 780.96  2020 Unknown Review of Systems:   Pertinent positive mentioned in interval hx/HPI. Other systems reviewed and negative     Vital Signs:    Last 24hrs VS reviewed since prior progress note. Most recent are:  Visit Vitals  /84 (BP 1 Location: Left arm, BP Patient Position: At rest)   Pulse 93   Temp 98.2 °F (36.8 °C)   Resp 16   Ht 5' 2\" (1.575 m)   Wt 80.7 kg (178 lb)   SpO2 98%   BMI 32.56 kg/m²       No intake or output data in the 24 hours ending 12/18/20 1720     Physical Examination:             Constitutional:  No acute distress, cooperative, pleasant, in pain    ENT:  Oral mucous moist, oropharynx benign. Neck supple,    Resp:  CTA bilaterally. No wheezing/rhonchi/rales. No accessory muscle use   CV:  Regular rhythm, normal rate, no murmurs, gallops, rubs    GI:  Soft, non distended, non tender. normoactive bowel sounds, no hepatosplenomegaly     Musculoskeletal:  No edema, warm, 2+ pulses throughout    Neurologic:  Cervical vertebral tenderness with radicular pain, limited active and passive motion of LUE, Otherwise Moves all extremities. AAOx3, CN II-XII reviewed           Data Review:   I personally reviewed labs and imaging     Labs:   No results for input(s): WBC, HGB, HCT, PLT, HGBEXT, HCTEXT, PLTEXT in the last 72 hours. No results for input(s): NA, K, CL, CO2, BUN, CREA, GLU, CA, MG, PHOS, URICA in the last 72 hours. No results for input(s): ALT, AP, TBIL, TBILI, TP, ALB, GLOB, GGT, AML, LPSE in the last 72 hours. No lab exists for component: SGOT, GPT, AMYP, HLPSE  No results for input(s): INR, PTP, APTT, INREXT in the last 72 hours. No results for input(s): FE, TIBC, PSAT, FERR in the last 72 hours. No results found for: FOL, RBCF   No results for input(s): PH, PCO2, PO2 in the last 72 hours. No results for input(s): CPK, CKNDX, TROIQ in the last 72 hours.     No lab exists for component: CPKMB  No results found for: CHOL, CHOLX, CHLST, CHOLV, HDL, HDLP, LDL, LDLC, DLDLP, TGLX, TRIGL, Mel Copper, CHHDX  No results found for: GLUCPOC  No results found for: COLOR, APPRN, SPGRU, REFSG, MARTIN, PROTU, GLUCU, KETU, BILU, UROU, FRANCISCO, LEUKU, GLUKE, EPSU, BACTU, WBCU, RBCU, CASTS, UCRY      Medications Reviewed:     Current Facility-Administered Medications   Medication Dose Route Frequency    sodium chloride (NS) flush 5-40 mL  5-40 mL IntraVENous Q8H    sodium chloride (NS) flush 5-40 mL  5-40 mL IntraVENous PRN    acetaminophen (TYLENOL) tablet 650 mg  650 mg Oral Q6H PRN    Or    acetaminophen (TYLENOL) suppository 650 mg  650 mg Rectal Q6H PRN    [Held by provider] ketorolac (TORADOL) injection 15 mg  15 mg IntraVENous Q6H PRN    morphine injection 2 mg  2 mg IntraVENous Q4H PRN    oxyCODONE IR (ROXICODONE) tablet 10 mg  10 mg Oral Q4H PRN    dexamethasone (DECADRON) 4 mg/mL injection 4 mg  4 mg IntraVENous Q8H    pantoprazole (PROTONIX) tablet 40 mg  40 mg Oral Q24H    cyclobenzaprine (FLEXERIL) tablet 10 mg  10 mg Oral TID PRN     ______________________________________________________________________  EXPECTED LENGTH OF STAY: - - -  ACTUAL LENGTH OF STAY:          0                 Brianna Hdez MD   Patient has given Verbal permission to discuss medical care with   persons present in the room and and also with contact as listed on face sheet. Please note that this dictation was completed with [employer], the computer voice recognition software. Quite often unanticipated grammatical, syntax, homophones, and other interpretive errors are inadvertently transcribed by the computer software. Please disregard these errors. Please excuse any errors that have escaped final proofreading. Thank you.

## 2020-12-18 NOTE — PROGRESS NOTES
Pt seen and counseled. Left arm guarded in flexed position. /bicep 3/5. Mri with large lateral hnp at c5-6 above fusion. Will put on or schedule for this weekend.   Risks and goals explained

## 2020-12-18 NOTE — ED PROVIDER NOTES
81-year-old female with past medical history of anxiety, asthma, eosinophilic esophagitis presents ambulatory with c/o LEFT side neck pain, woke up in pain, 2 days ago. RIGHT hand dominant. Patient endorses being seen yesterday at [facility], had a negative x-ray and rcvd Toradol, valium, steroid pack. Started steroid pack today and took Toradol last at 1300 with no relief. Patient states that she attempted ice and her soft collar at home but was not able to tolerated either. Endorses having a C5-C6 [facility] in [place]. Patient endorses being unable to sleep secondary to pain. No fevers, chills, cough, denies feeling dizzy or lightheaded, CP, SOB, abdominal pain N/V/D, denies difficulty swallowing, states that she had a slight sore throat after recent endoscopy. Patient denies numbness or tingling, denies fall or injury, denies loss of control of bowel or bladder, denies difficulty urinating or moving her bowels, denies saddle numbness. Endorses pain shooting down the left arm only, with neck pain and limited range of motion.               Past Medical History:   Diagnosis Date    Anxiety     Asthma     no medication    EE (eosinophilic esophagitis)        Past Surgical History:   Procedure Laterality Date    HX CHOLECYSTECTOMY      IR BX NECK  / THORAX TISSUE      benign    NEUROLOGICAL PROCEDURE UNLISTED      neck fusion         Family History:   Problem Relation Age of Onset    Other Mother         Plantar Fasciitis    Cancer Father         prostate    Depression Father        Social History     Socioeconomic History    Marital status: SINGLE     Spouse name: Not on file    Number of children: Not on file    Years of education: Not on file    Highest education level: Not on file   Occupational History    Not on file   Social Needs    Financial resource strain: Not on file    Food insecurity     Worry: Not on file     Inability: Not on file    Transportation needs     Medical: Not on file     Non-medical: Not on file   Tobacco Use    Smoking status: Never Smoker    Smokeless tobacco: Never Used   Substance and Sexual Activity    Alcohol use: Not Currently    Drug use: Never    Sexual activity: Not Currently   Lifestyle    Physical activity     Days per week: Not on file     Minutes per session: Not on file    Stress: Not on file   Relationships    Social connections     Talks on phone: Not on file     Gets together: Not on file     Attends Holiness service: Not on file     Active member of club or organization: Not on file     Attends meetings of clubs or organizations: Not on file     Relationship status: Not on file    Intimate partner violence     Fear of current or ex partner: Not on file     Emotionally abused: Not on file     Physically abused: Not on file     Forced sexual activity: Not on file   Other Topics Concern    Not on file   Social History Narrative    Not on file         ALLERGIES: Ativan [lorazepam]    Review of Systems   Constitutional: Negative for chills and fever. HENT: Positive for sore throat. Negative for trouble swallowing and voice change. Respiratory: Negative for cough and shortness of breath. Cardiovascular: Negative for chest pain. Gastrointestinal: Negative for abdominal pain, diarrhea, nausea and vomiting. Genitourinary: Negative for difficulty urinating and dysuria. Musculoskeletal: Positive for arthralgias, myalgias, neck pain and neck stiffness. Left side neck pain with sharp shooting pain down the left arm. Skin: Negative for rash and wound. Allergic/Immunologic: Negative for immunocompromised state. Neurological: Positive for weakness. Negative for dizziness, light-headedness, numbness and headaches. Weakness to the left upper extremity. Psychiatric/Behavioral: Positive for sleep disturbance. All other systems reviewed and are negative.       Vitals:    12/17/20 1916   BP: 111/69   Pulse: 92   Resp: 18   Temp: (!) 95.8 °F (35.4 °C)   SpO2: 100%   Weight: 80.7 kg (178 lb)   Height: 5' 2\" (1.575 m)            Physical Exam  Vitals signs and nursing note reviewed. Constitutional:       Appearance: Normal appearance. HENT:      Head: Normocephalic. Nose: Nose normal.      Mouth/Throat:      Mouth: Mucous membranes are moist.   Eyes:      Pupils: Pupils are equal, round, and reactive to light. Neck:      Musculoskeletal: Decreased range of motion. Neck rigidity, pain with movement, spinous process tenderness and muscular tenderness present. No erythema or injury. Trachea: Trachea and phonation normal.      Comments: Patient able to fully turn head to the right, unable to turn to the left at all. Cervical spine tenderness, no erythema, no wound, no rash. Voice is clear, strong 2+ left wrist radial pulse. Cardiovascular:      Rate and Rhythm: Normal rate. Pulmonary:      Effort: Pulmonary effort is normal. No respiratory distress. Abdominal:      General: There is no distension. Skin:     General: Skin is dry. Neurological:      Mental Status: She is alert and oriented to person, place, and time. Psychiatric:         Mood and Affect: Mood normal.          MDM  Number of Diagnoses or Management Options  Diagnosis management comments: Possible: dislodges fusion vs spinal compression vs pinched nerve vs spinal stenosis  Plan: analgesia, MRI cervical        Amount and/or Complexity of Data Reviewed  Tests in the radiology section of CPT®: ordered      VITAL SIGNS:  No data found. LABS:  No results found for this or any previous visit (from the past 6 hour(s)). IMAGING:  MRI CERV SPINE WO CONT   Final Result   IMPRESSION:   Disc degenerative changes most dense at C5-C6. There is mild canal and severe left foraminal stenosis at C5-6 with a moderate   left lateral recess and left foraminal protrusion/osteophyte at C5-6.       XR SHOULDER LT AP/LAT MIN 2 V   Final Result   IMPRESSION: No acute abnormality. Medications During Visit:  Medications   morphine injection 4 mg (4 mg IntraVENous Given 12/17/20 2125)   diazePAM (VALIUM) injection 5 mg (5 mg IntraVENous Given 12/17/20 2126)   ketorolac (TORADOL) injection 15 mg (15 mg IntraVENous Given 12/17/20 2125)   HYDROmorphone (PF) (DILAUDID) injection 1 mg (1 mg IntraVENous Given 12/17/20 2341)         DECISION MAKING:  Vale Perez is a 30 y.o. female who comes in as above. 78-year-old female, afebrile, otherwise hemodynamically stable presents ambulatory holding her left arm clenched to her chest, patient states that she had a cervical fusion years ago while in the state of [place]. States that she woke up 2 nights ago with increased pain to the mid that radiated sharp shooting pain into the left arm. Patient denies fall or injury, states that [facility] had negative x-ray results, states that she started the steroids, Toradol and Valium, and remains to be in excruciating pain. Pain 10 out of 10, on exam no erythema noted to the neck, left upper extremity 3 out of 5 strength, 2+ strong palpable left radial pulse, capillary refill less than 2 seconds. Patient states she attempted ice at home and to place her soft collar, unable to tolerate the collar due to increased pain and discomfort. Patient recent EGD on 12/9/2020, history of EE, states that she is swallowing ok and has minimal throat discomfort. Patient denies weakness/ numbness or tingling to the lower extremities. Patient unable to turn her head to the left, patient is able to turn her head completely independently to the right. Patient specifically denies any saddle numbness, denies any IV drug use, denies any rash. Plan discussed with patient to treat for discomfort, and to obtain MRI. 2315 Re-evaluation: Patient remains to be in a lot of discomfort and pain, patient sitting fully upright stiffly.  MRI results discussed with patient with mild canal and severe left foraminal stenosis at C5-6, with moderate left lateral recess and protrusion. C5-6 fusion performed in 2015. Patient states that this is exactly what happened prior to last surgery. Advised patient that I will dose with additional medication to try to relieve some discomfort. Patient aware that Neurosurgery is being paged. [id number] spoke with Dr Yvon Ramon reviewed presentation, Hx, and RI results. Plan discussed to admit patient for intractable pain, MD to see in the am to further discuss plan and to continue Steroids. Patient states that she did take her prescribed steroids at 1300 today. Patient remains hemodynamically stable at this time. Plan discussed with patient to admit for pain control and further evaluation by Neurosurgery in the am, patient remains stable at this time, verbalizes understanding and agrees with plan. IMPRESSION:  1. Spinal stenosis of cervical region    2. Intractable cervical neuropathic pain        DISPOSITION:  Admitted      Current Discharge Medication List           Follow-up Information    None       Perfect Serve Consult for Admission  12:00 AM    ED Room Number: R35/R35  Patient Name and age:  Stewart Bernard 30 y.o.  female  Working Diagnosis:   1. Spinal stenosis of cervical region    2. Intractable cervical neuropathic pain        COVID-19 Suspicion:  no  Sepsis present:  no    Reassessment needed: no  Code Status:  Full Code  Readmission: no  Isolation Requirements:  no  Recommended Level of Care:  med/surg  Department:[facility] - [facility]   Other:  Patient with known HX C5-6 fusion in 2015, presents with increased pain x 2 days, started steroid pack after being seen at [facility] yesterday, presents today with increased pain, weakness. MRI Mild canal and severe stenosis at C5-6, with left lateral recess and protrusion.  Admit for pain control, IV steroids,and  Dr Peyton Duncan to see in am, patient took already prescribed steroids at 1300 prior to arrival.         Procedures    Discussed patient care with Rubio Young MD. Attending was given the opportunity to see and evaluate the patient. Agrees with care plan as discussed.   Iveth Bauman NP  9:29 PM

## 2020-12-18 NOTE — ED TRIAGE NOTES
Triage:  Pt to the ED due to right side neck, shoulder, arm pain since yesterday. Pt denies any injury, states history of cervical disk problems.

## 2020-12-18 NOTE — PROGRESS NOTES
Bedside shift change report given to Isaiah Hernández RN (oncoming nurse) by Lee Ann Abreu (offgoing nurse). Report included the following information SBAR, Kardex, Procedure Summary, Intake/Output, MAR and Recent Results.

## 2020-12-18 NOTE — H&P
[facility] [facility]  Hospitalist Group  History and Physical    Primary Care Provider: Ignacio Navarro MD  Date of Service:  12/18/2020    Subjective:     Shana Stephens is a 30 y.o. female with a past medical history of C5-C6 cervical fusion in [place]. In 2015, anxiety, asthma, and eosinophilic esophagitis who presents with left-sided neck pain with radiation down her left arm. Evaluated in the ED, and reportedly underwent imaging that was normal.  She was treated with Toradol, Valium, and steroids with no relief. He denies any acute injuries, or change in physical activity. She denies upper or lower extremity paresthesias, or weakness. In the ED, shoulder x-ray was normal.  Cervical spine MRI showed mild canal, and severe left foraminal stenosis at C5-C6. Dr. Louann Holloway from neurosurgery was consulted, and recommended steroids with pain control, and will see patient as a consultation. Review of Systems:    A comprehensive review of systems was negative except for that written in the History of Present Illness. Past Medical History:   Diagnosis Date    Anxiety     Asthma     no medication    EE (eosinophilic esophagitis)       Past Surgical History:   Procedure Laterality Date    HX CHOLECYSTECTOMY      IR BX NECK  / THORAX TISSUE      benign    NEUROLOGICAL PROCEDURE UNLISTED      neck fusion     Prior to Admission medications    Medication Sig Start Date End Date Taking? Authorizing Provider   methocarbamoL (Robaxin) 500 mg tablet Take 500 mg by mouth two (2) times a day. Provider, Historical   amitriptyline (ELAVIL) 25 mg tablet Take 25 mg by mouth nightly as needed for Sleep. Provider, Historical   pantoprazole (Protonix) 40 mg tablet Take 1 Tab by mouth two (2) times a day.  10/21/20   Ignacio Navarro MD     Allergies   Allergen Reactions    Ativan [Lorazepam] Other (comments)     Patient unsure of reaction      Family History   Problem Relation Age of Onset    Other Mother         Plantar Fasciitis    Cancer Father         prostate    Depression Father         SOCIAL HISTORY:  Patient resides at Home. Patient ambulates independently  Smoking history: never  Alcohol history: none        Objective:       Physical Exam:   Visit Vitals  /75 (BP 1 Location: Left arm, BP Patient Position: At rest)   Pulse 76   Temp 97.7 °F (36.5 °C)   Resp 16   Ht 5' 2\" (1.575 m)   Wt 80.7 kg (178 lb)   SpO2 98%   BMI 32.56 kg/m²     General:  Alert, cooperative, no distress, appears stated age. Head:  Normocephalic, without obvious abnormality, atraumatic. Limited ROM due to pain   Eyes:  Conjunctivae/corneas clear. EOMs intact. .           Throat: Lips, mucosa, and tongue normal. Teeth and gums normal.   Neck: Supple, symmetrical, trachea midline, no adenopathy, thyroid: no enlargement/tenderness/nodules, no carotid bruit and no JVD. Back:   Symmetric, no curvature. ROM normal. No CVA tenderness. Lungs:   Clear to auscultation bilaterally. Chest wall:  No tenderness or deformity. Heart:  Regular rate and rhythm, S1, S2 normal, no murmur, click, rub or gallop. Abdomen:   Soft, non-tender. Bowel sounds normal. No masses,  No organomegaly. Extremities: Extremities normal, atraumatic, no cyanosis or edema. Pulses: 2+ radial pulses   Skin: Skin color, texture, turgor normal. No rashes or lesions. Neuro: 5 out of 5 bilateral upper and lower extremity strength. Bilateral upper and lower extremity sensation intact. Data Review: All diagnostic labs and studies have been reviewed. Assessment:     Active Problems:    Pain (12/18/2020)        Plan:     #Left C5-C6 cervical radicular pain  -hx C5-C6 fusion in 2015 in [place].    -C-spine MRI with mild canal, and severe left foraminal stenosis  -Failed outpatient therapy with steroids, and NSAIDs.  -Dr. Nereyda Guzman from neurosurgery consulted in the ED, and recommended admission for pain control, and will evaluate patient.  -Solu-Medrol 40 mg twice daily    Code: full  MPOA: Jalaine Boeck [phone]  dvt ppx: SCD    FUNCTIONAL STATUS PRIOR TO HOSPITALIZATION Ambulates Independently     Signed By: Дмитрий Chamorro MD     December 18, 2020

## 2020-12-18 NOTE — PROGRESS NOTES
Primary Nurse Rosalind Tesfaye and Tatum Saini, RN performed a dual skin assessment on this patient No impairment noted  Adriano score is 21

## 2020-12-18 NOTE — ROUTINE PROCESS
TRANSFER - OUT REPORT: 
 
Verbal report given to [address] (name) on [facility]  being transferred to 5S BED  (unit) for routine progression of care Report consisted of patients Situation, Background, Assessment and  
Recommendations(SBAR). Information from the following report(s) SBAR, ED Summary, Procedure Summary, Intake/Output, MAR and Recent Results was reviewed with the receiving nurse. Lines:  
Peripheral IV 12/17/20 Right Antecubital (Active) Site Assessment Clean, dry, & intact 12/17/20 2124 Phlebitis Assessment 0 12/17/20 2124 Infiltration Assessment 0 12/17/20 2124 Dressing Status Clean, dry, & intact 12/17/20 2124 Dressing Type Transparent 12/17/20 2124 Hub Color/Line Status Pink 12/17/20 2124 Opportunity for questions and clarification was provided. Patient transported with: 
 Transport Visit Vitals /84 (BP 1 Location: Right arm, BP Patient Position: At rest) Pulse 69 Temp 97.6 °F (36.4 °C) Resp 14 Ht 5' 2\" (1.575 m) Wt 80.7 kg (178 lb) SpO2 99% BMI 32.56 kg/m²

## 2020-12-18 NOTE — CONSULTS
34yo with ACDF 6/7 5 years ago. 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 Will discuss treatment options with patient.

## 2020-12-18 NOTE — PROGRESS NOTES
Neurosurgery Progress Note  Calli Christianson, [facility]        Admit Date: 2020   LOS: 0 days        Daily Progress Note: 2020      Subjective: The patient has a history of a previous ACDF at C6/7 5 years ago in [place]. Prior to that surgery, she had right upper extremity weakness and numbness. She did well after surgery. She recently developed neck pain and left upper extremity pain and it became severe 2 days ago. She went to [facility] ER and received Toradol, Valium, and a steroid pack. She still did not have good relief this morning, so she presented to the [facility] ER. She is having pain shooting down the left arm. She is unable to sleep and having difficulty getting into a position that is comfortable. She is guarding her left arm and trying to keep it in a certain position to prevent the pain. She says she has tried gabapentin in the past on maximum dosage without relief. She says she gets some relief with roxicodone, but it does not last very long. Denies chest pain, leg pain, nausea, vomiting, difficulty swallowing, headache, and dyspnea. Objective:     Vital signs  Temp (24hrs), Av.2 °F (36.2 °C), Min:95.8 °F (35.4 °C), Max:97.8 °F (36.6 °C)   No intake/output data recorded. No intake/output data recorded. Visit Vitals  /78 (BP 1 Location: Left arm, BP Patient Position: At rest)   Pulse 87   Temp 97.8 °F (36.6 °C)   Resp 17   Ht 5' 2\" (1.575 m)   Wt 80.7 kg (178 lb)   SpO2 99%   BMI 32.56 kg/m²      O2 Device: Room air     Pain control  Pain Assessment  Pain Scale 1: Numeric (0 - 10)  Pain Intensity 1: 10  Pain Onset 1: Acute  Pain Location 1: Back  Pain Orientation 1: Posterior  Pain Description 1: Aching  Pain Intervention(s) 1: Medication (see MAR)    PT/OT  Gait                 Physical Exam:  Gen:Looks like she is in pain, guarding her left arm. Neuro: A&Ox3. Follows commands. Speech clear. Affect normal.  PERRL. EOMI. Face symmetric.  Tongue midline. CHAPMAN spontaneously. Strength 5/5 in RUE/RLE/LLE, 4-/5 LUE triceps/biceps/hand intrinsics. Gait deferred. MRI C-spine without contrast on 12/17/2020 shows disc degenerative changes most dense at C5-C6. There is mild canal and severe left foraminal stenosis at C5-6 with a moderate left lateral recess and left foraminal protrusion/osteophyte at C5-6. CT C-spine without contrast on 12/18/2020 shows C6-7 anterior cervical fixation with interbody graft placement. Solid osseous fusion of the centrally positioned graft is demonstrated. No disruption of the fixation device is shown. Left lateral and subarticular disc herniation at C5-6 with severe left subarticular stenosis. 24 hour results:    No results found for this or any previous visit (from the past 24 hour(s)). Assessment:     Active Problems:    Pain (12/18/2020)        Plan:   1. Herniated cervical disc   - Pain control - morphine PRN, roxicodone PRN, Flexeril PRN.  Hold Toradol for possible surgery   - Will need an ACDF at adjacent level to prior ACDF   - Working on timing   - Mellissa Grass as needed   - Send routine pre-op labs     Activity: up ad don  DVT ppx: SCDs  Dispo: tbd    Plan d/w Dr. Dvein Barba, NP

## 2020-12-19 ENCOUNTER — ANESTHESIA EVENT (OUTPATIENT)
Dept: SURGERY | Age: 35
End: 2020-12-19
Payer: MEDICAID

## 2020-12-19 ENCOUNTER — APPOINTMENT (OUTPATIENT)
Dept: GENERAL RADIOLOGY | Age: 35
End: 2020-12-19
Attending: INTERNAL MEDICINE
Payer: MEDICAID

## 2020-12-19 ENCOUNTER — ANESTHESIA (OUTPATIENT)
Dept: SURGERY | Age: 35
End: 2020-12-19
Payer: MEDICAID

## 2020-12-19 LAB
ANION GAP SERPL CALC-SCNC: 6 MMOL/L (ref 5–15)
BUN SERPL-MCNC: 12 MG/DL (ref 6–20)
BUN/CREAT SERPL: 20 (ref 12–20)
CALCIUM SERPL-MCNC: 9.1 MG/DL (ref 8.5–10.1)
CHLORIDE SERPL-SCNC: 106 MMOL/L (ref 97–108)
CO2 SERPL-SCNC: 22 MMOL/L (ref 21–32)
CREAT SERPL-MCNC: 0.6 MG/DL (ref 0.55–1.02)
GLUCOSE SERPL-MCNC: 124 MG/DL (ref 65–100)
INR PPP: 1 (ref 0.9–1.1)
POTASSIUM SERPL-SCNC: 4.4 MMOL/L (ref 3.5–5.1)
PROTHROMBIN TIME: 10.3 SEC (ref 9–11.1)
SODIUM SERPL-SCNC: 134 MMOL/L (ref 136–145)

## 2020-12-19 PROCEDURE — 74011250636 HC RX REV CODE- 250/636: Performed by: NEUROLOGICAL SURGERY

## 2020-12-19 PROCEDURE — 74011250636 HC RX REV CODE- 250/636: Performed by: ANESTHESIOLOGY

## 2020-12-19 PROCEDURE — 74011250636 HC RX REV CODE- 250/636: Performed by: INTERNAL MEDICINE

## 2020-12-19 PROCEDURE — C1713 ANCHOR/SCREW BN/BN,TIS/BN: HCPCS | Performed by: NEUROLOGICAL SURGERY

## 2020-12-19 PROCEDURE — 76060000034 HC ANESTHESIA 1.5 TO 2 HR: Performed by: NEUROLOGICAL SURGERY

## 2020-12-19 PROCEDURE — 96376 TX/PRO/DX INJ SAME DRUG ADON: CPT

## 2020-12-19 PROCEDURE — 74011250636 HC RX REV CODE- 250/636

## 2020-12-19 PROCEDURE — 77030014647 HC SEAL FBRN TISSL BAXT -D: Performed by: NEUROLOGICAL SURGERY

## 2020-12-19 PROCEDURE — 74011250636 HC RX REV CODE- 250/636: Performed by: NURSE ANESTHETIST, CERTIFIED REGISTERED

## 2020-12-19 PROCEDURE — 77030002933 HC SUT MCRYL J&J -A: Performed by: NEUROLOGICAL SURGERY

## 2020-12-19 PROCEDURE — 77030013079 HC BLNKT BAIR HGGR 3M -A: Performed by: ANESTHESIOLOGY

## 2020-12-19 PROCEDURE — 99218 HC RM OBSERVATION: CPT

## 2020-12-19 PROCEDURE — 77030003909 HC BIT DRL W/STP SYNT -D: Performed by: NEUROLOGICAL SURGERY

## 2020-12-19 PROCEDURE — 77030003666 HC NDL SPINAL BD -A: Performed by: NEUROLOGICAL SURGERY

## 2020-12-19 PROCEDURE — 77030011267 HC ELECTRD BLD COVD -A: Performed by: NEUROLOGICAL SURGERY

## 2020-12-19 PROCEDURE — 77030016293 HC SPCR SPN ZEROP SYNT -I2: Performed by: NEUROLOGICAL SURGERY

## 2020-12-19 PROCEDURE — 76010000162 HC OR TIME 1.5 TO 2 HR INTENSV-TIER 1: Performed by: NEUROLOGICAL SURGERY

## 2020-12-19 PROCEDURE — 72020 X-RAY EXAM OF SPINE 1 VIEW: CPT

## 2020-12-19 PROCEDURE — 80048 BASIC METABOLIC PNL TOTAL CA: CPT

## 2020-12-19 PROCEDURE — 2709999900 HC NON-CHARGEABLE SUPPLY: Performed by: NEUROLOGICAL SURGERY

## 2020-12-19 PROCEDURE — 74011000250 HC RX REV CODE- 250: Performed by: NURSE ANESTHETIST, CERTIFIED REGISTERED

## 2020-12-19 PROCEDURE — 94760 N-INVAS EAR/PLS OXIMETRY 1: CPT

## 2020-12-19 PROCEDURE — 77030040361 HC SLV COMPR DVT MDII -B: Performed by: NEUROLOGICAL SURGERY

## 2020-12-19 PROCEDURE — 77030010507 HC ADH SKN DERMBND J&J -B: Performed by: NEUROLOGICAL SURGERY

## 2020-12-19 PROCEDURE — 36415 COLL VENOUS BLD VENIPUNCTURE: CPT

## 2020-12-19 PROCEDURE — 77030026438 HC STYL ET INTUB CARD -A: Performed by: ANESTHESIOLOGY

## 2020-12-19 PROCEDURE — 77030029099 HC BN WAX SSPC -A: Performed by: NEUROLOGICAL SURGERY

## 2020-12-19 PROCEDURE — 76210000016 HC OR PH I REC 1 TO 1.5 HR: Performed by: NEUROLOGICAL SURGERY

## 2020-12-19 PROCEDURE — 77030003029 HC SUT VCRL J&J -B: Performed by: NEUROLOGICAL SURGERY

## 2020-12-19 PROCEDURE — 77030004391 HC BUR FLUT MEDT -C: Performed by: NEUROLOGICAL SURGERY

## 2020-12-19 PROCEDURE — 77030039969 HC GRFT BN SUB PRIME HD MUSC -C: Performed by: NEUROLOGICAL SURGERY

## 2020-12-19 PROCEDURE — 74011250637 HC RX REV CODE- 250/637: Performed by: NEUROLOGICAL SURGERY

## 2020-12-19 PROCEDURE — 77030008684 HC TU ET CUF COVD -B: Performed by: ANESTHESIOLOGY

## 2020-12-19 PROCEDURE — 85610 PROTHROMBIN TIME: CPT

## 2020-12-19 PROCEDURE — 74011000250 HC RX REV CODE- 250: Performed by: NEUROLOGICAL SURGERY

## 2020-12-19 PROCEDURE — 77030038600 HC TU BPLR IRR DISP STRY -B: Performed by: NEUROLOGICAL SURGERY

## 2020-12-19 DEVICE — GRAFT BONE SUB 1CC DEMIN BONE MTRX PRIM HD: Type: IMPLANTABLE DEVICE | Site: SPINE CERVICAL | Status: FUNCTIONAL

## 2020-12-19 DEVICE — SPACER SPNL ZERO-P VA IMPL 8MM LORDOTIC STERILE: Type: IMPLANTABLE DEVICE | Site: SPINE CERVICAL | Status: FUNCTIONAL

## 2020-12-19 DEVICE — SCREW SPNL L14MM DIA3.7MM G ANT CERV TI SELF DRL ZERO-P VA: Type: IMPLANTABLE DEVICE | Site: SPINE CERVICAL | Status: FUNCTIONAL

## 2020-12-19 RX ORDER — FENTANYL CITRATE 50 UG/ML
50 INJECTION, SOLUTION INTRAMUSCULAR; INTRAVENOUS AS NEEDED
Status: DISCONTINUED | OUTPATIENT
Start: 2020-12-19 | End: 2020-12-19 | Stop reason: HOSPADM

## 2020-12-19 RX ORDER — MORPHINE SULFATE 10 MG/ML
2 INJECTION, SOLUTION INTRAMUSCULAR; INTRAVENOUS
Status: DISCONTINUED | OUTPATIENT
Start: 2020-12-19 | End: 2020-12-19 | Stop reason: HOSPADM

## 2020-12-19 RX ORDER — DEXAMETHASONE SODIUM PHOSPHATE 4 MG/ML
INJECTION, SOLUTION INTRA-ARTICULAR; INTRALESIONAL; INTRAMUSCULAR; INTRAVENOUS; SOFT TISSUE AS NEEDED
Status: DISCONTINUED | OUTPATIENT
Start: 2020-12-19 | End: 2020-12-19 | Stop reason: HOSPADM

## 2020-12-19 RX ORDER — SODIUM CHLORIDE AND POTASSIUM CHLORIDE .9; .15 G/100ML; G/100ML
SOLUTION INTRAVENOUS
Status: COMPLETED
Start: 2020-12-19 | End: 2020-12-19

## 2020-12-19 RX ORDER — PROPOFOL 10 MG/ML
INJECTION, EMULSION INTRAVENOUS AS NEEDED
Status: DISCONTINUED | OUTPATIENT
Start: 2020-12-19 | End: 2020-12-19 | Stop reason: HOSPADM

## 2020-12-19 RX ORDER — SUCRALFATE 1 G/1
1 TABLET ORAL
Status: DISCONTINUED | OUTPATIENT
Start: 2020-12-19 | End: 2020-12-21 | Stop reason: HOSPADM

## 2020-12-19 RX ORDER — FENTANYL CITRATE 50 UG/ML
INJECTION, SOLUTION INTRAMUSCULAR; INTRAVENOUS AS NEEDED
Status: DISCONTINUED | OUTPATIENT
Start: 2020-12-19 | End: 2020-12-19 | Stop reason: HOSPADM

## 2020-12-19 RX ORDER — DEXMEDETOMIDINE HYDROCHLORIDE 100 UG/ML
INJECTION, SOLUTION INTRAVENOUS AS NEEDED
Status: DISCONTINUED | OUTPATIENT
Start: 2020-12-19 | End: 2020-12-19 | Stop reason: HOSPADM

## 2020-12-19 RX ORDER — LIDOCAINE HYDROCHLORIDE 10 MG/ML
0.1 INJECTION, SOLUTION EPIDURAL; INFILTRATION; INTRACAUDAL; PERINEURAL AS NEEDED
Status: DISCONTINUED | OUTPATIENT
Start: 2020-12-19 | End: 2020-12-19 | Stop reason: HOSPADM

## 2020-12-19 RX ORDER — SODIUM CHLORIDE, SODIUM LACTATE, POTASSIUM CHLORIDE, CALCIUM CHLORIDE 600; 310; 30; 20 MG/100ML; MG/100ML; MG/100ML; MG/100ML
100 INJECTION, SOLUTION INTRAVENOUS CONTINUOUS
Status: DISCONTINUED | OUTPATIENT
Start: 2020-12-19 | End: 2020-12-19 | Stop reason: HOSPADM

## 2020-12-19 RX ORDER — PANTOPRAZOLE SODIUM 40 MG/1
40 TABLET, DELAYED RELEASE ORAL 2 TIMES DAILY
Status: DISCONTINUED | OUTPATIENT
Start: 2020-12-19 | End: 2020-12-21 | Stop reason: HOSPADM

## 2020-12-19 RX ORDER — MIDAZOLAM HYDROCHLORIDE 1 MG/ML
INJECTION, SOLUTION INTRAMUSCULAR; INTRAVENOUS AS NEEDED
Status: DISCONTINUED | OUTPATIENT
Start: 2020-12-19 | End: 2020-12-19 | Stop reason: HOSPADM

## 2020-12-19 RX ORDER — MIDAZOLAM HYDROCHLORIDE 1 MG/ML
1 INJECTION, SOLUTION INTRAMUSCULAR; INTRAVENOUS AS NEEDED
Status: DISCONTINUED | OUTPATIENT
Start: 2020-12-19 | End: 2020-12-19 | Stop reason: HOSPADM

## 2020-12-19 RX ORDER — MIDAZOLAM HYDROCHLORIDE 1 MG/ML
0.5 INJECTION, SOLUTION INTRAMUSCULAR; INTRAVENOUS
Status: DISCONTINUED | OUTPATIENT
Start: 2020-12-19 | End: 2020-12-19 | Stop reason: HOSPADM

## 2020-12-19 RX ORDER — HYDROMORPHONE HYDROCHLORIDE 1 MG/ML
0.2 INJECTION, SOLUTION INTRAMUSCULAR; INTRAVENOUS; SUBCUTANEOUS
Status: DISPENSED | OUTPATIENT
Start: 2020-12-19 | End: 2020-12-19

## 2020-12-19 RX ORDER — AMITRIPTYLINE HYDROCHLORIDE 25 MG/1
25 TABLET, FILM COATED ORAL
Status: DISCONTINUED | OUTPATIENT
Start: 2020-12-19 | End: 2020-12-21 | Stop reason: HOSPADM

## 2020-12-19 RX ORDER — SODIUM CHLORIDE 9 MG/ML
25 INJECTION, SOLUTION INTRAVENOUS CONTINUOUS
Status: DISCONTINUED | OUTPATIENT
Start: 2020-12-19 | End: 2020-12-19 | Stop reason: HOSPADM

## 2020-12-19 RX ORDER — SUCCINYLCHOLINE CHLORIDE 20 MG/ML
INJECTION INTRAMUSCULAR; INTRAVENOUS AS NEEDED
Status: DISCONTINUED | OUTPATIENT
Start: 2020-12-19 | End: 2020-12-19 | Stop reason: HOSPADM

## 2020-12-19 RX ORDER — ONDANSETRON 2 MG/ML
INJECTION INTRAMUSCULAR; INTRAVENOUS AS NEEDED
Status: DISCONTINUED | OUTPATIENT
Start: 2020-12-19 | End: 2020-12-19 | Stop reason: HOSPADM

## 2020-12-19 RX ORDER — SODIUM CHLORIDE AND POTASSIUM CHLORIDE .9; .15 G/100ML; G/100ML
SOLUTION INTRAVENOUS CONTINUOUS
Status: DISCONTINUED | OUTPATIENT
Start: 2020-12-19 | End: 2020-12-20

## 2020-12-19 RX ORDER — ROCURONIUM BROMIDE 10 MG/ML
INJECTION, SOLUTION INTRAVENOUS AS NEEDED
Status: DISCONTINUED | OUTPATIENT
Start: 2020-12-19 | End: 2020-12-19 | Stop reason: HOSPADM

## 2020-12-19 RX ORDER — HYDROMORPHONE HYDROCHLORIDE 1 MG/ML
0.2 INJECTION, SOLUTION INTRAMUSCULAR; INTRAVENOUS; SUBCUTANEOUS
Status: DISCONTINUED | OUTPATIENT
Start: 2020-12-19 | End: 2020-12-19 | Stop reason: HOSPADM

## 2020-12-19 RX ORDER — ROPIVACAINE HYDROCHLORIDE 5 MG/ML
150 INJECTION, SOLUTION EPIDURAL; INFILTRATION; PERINEURAL AS NEEDED
Status: DISCONTINUED | OUTPATIENT
Start: 2020-12-19 | End: 2020-12-19 | Stop reason: HOSPADM

## 2020-12-19 RX ORDER — CEFAZOLIN SODIUM 1 G/3ML
INJECTION, POWDER, FOR SOLUTION INTRAMUSCULAR; INTRAVENOUS AS NEEDED
Status: DISCONTINUED | OUTPATIENT
Start: 2020-12-19 | End: 2020-12-19 | Stop reason: HOSPADM

## 2020-12-19 RX ORDER — DIPHENHYDRAMINE HYDROCHLORIDE 50 MG/ML
12.5 INJECTION, SOLUTION INTRAMUSCULAR; INTRAVENOUS AS NEEDED
Status: DISCONTINUED | OUTPATIENT
Start: 2020-12-19 | End: 2020-12-19 | Stop reason: HOSPADM

## 2020-12-19 RX ORDER — FENTANYL CITRATE 50 UG/ML
25 INJECTION, SOLUTION INTRAMUSCULAR; INTRAVENOUS
Status: DISCONTINUED | OUTPATIENT
Start: 2020-12-19 | End: 2020-12-19 | Stop reason: HOSPADM

## 2020-12-19 RX ORDER — ONDANSETRON 2 MG/ML
4 INJECTION INTRAMUSCULAR; INTRAVENOUS AS NEEDED
Status: DISCONTINUED | OUTPATIENT
Start: 2020-12-19 | End: 2020-12-19 | Stop reason: HOSPADM

## 2020-12-19 RX ORDER — SODIUM CHLORIDE, SODIUM LACTATE, POTASSIUM CHLORIDE, CALCIUM CHLORIDE 600; 310; 30; 20 MG/100ML; MG/100ML; MG/100ML; MG/100ML
1000 INJECTION, SOLUTION INTRAVENOUS CONTINUOUS
Status: DISCONTINUED | OUTPATIENT
Start: 2020-12-19 | End: 2020-12-19 | Stop reason: HOSPADM

## 2020-12-19 RX ORDER — HYDROMORPHONE HYDROCHLORIDE 2 MG/ML
INJECTION, SOLUTION INTRAMUSCULAR; INTRAVENOUS; SUBCUTANEOUS AS NEEDED
Status: DISCONTINUED | OUTPATIENT
Start: 2020-12-19 | End: 2020-12-19 | Stop reason: HOSPADM

## 2020-12-19 RX ORDER — OXYCODONE HYDROCHLORIDE 5 MG/1
5 TABLET ORAL AS NEEDED
Status: DISCONTINUED | OUTPATIENT
Start: 2020-12-19 | End: 2020-12-19 | Stop reason: HOSPADM

## 2020-12-19 RX ORDER — LIDOCAINE HYDROCHLORIDE 20 MG/ML
INJECTION, SOLUTION EPIDURAL; INFILTRATION; INTRACAUDAL; PERINEURAL AS NEEDED
Status: DISCONTINUED | OUTPATIENT
Start: 2020-12-19 | End: 2020-12-19 | Stop reason: HOSPADM

## 2020-12-19 RX ORDER — ACETAMINOPHEN 325 MG/1
650 TABLET ORAL ONCE
Status: DISCONTINUED | OUTPATIENT
Start: 2020-12-19 | End: 2020-12-19 | Stop reason: HOSPADM

## 2020-12-19 RX ORDER — DIPHENHYDRAMINE HYDROCHLORIDE 50 MG/ML
25 INJECTION, SOLUTION INTRAMUSCULAR; INTRAVENOUS
Status: DISCONTINUED | OUTPATIENT
Start: 2020-12-19 | End: 2020-12-21

## 2020-12-19 RX ORDER — EPHEDRINE SULFATE/0.9% NACL/PF 50 MG/5 ML
5 SYRINGE (ML) INTRAVENOUS AS NEEDED
Status: DISCONTINUED | OUTPATIENT
Start: 2020-12-19 | End: 2020-12-19 | Stop reason: HOSPADM

## 2020-12-19 RX ADMIN — DIPHENHYDRAMINE HYDROCHLORIDE 12.5 MG: 50 INJECTION, SOLUTION INTRAMUSCULAR; INTRAVENOUS at 10:55

## 2020-12-19 RX ADMIN — MORPHINE SULFATE 2 MG: 2 INJECTION, SOLUTION INTRAMUSCULAR; INTRAVENOUS at 18:09

## 2020-12-19 RX ADMIN — FENTANYL CITRATE 50 MCG: 50 INJECTION, SOLUTION INTRAMUSCULAR; INTRAVENOUS at 08:08

## 2020-12-19 RX ADMIN — MORPHINE SULFATE 2 MG: 2 INJECTION, SOLUTION INTRAMUSCULAR; INTRAVENOUS at 13:02

## 2020-12-19 RX ADMIN — CEFAZOLIN SODIUM 2 G: 1 INJECTION, POWDER, FOR SOLUTION INTRAMUSCULAR; INTRAVENOUS at 23:53

## 2020-12-19 RX ADMIN — MORPHINE SULFATE 2 MG: 2 INJECTION, SOLUTION INTRAMUSCULAR; INTRAVENOUS at 04:36

## 2020-12-19 RX ADMIN — SODIUM CHLORIDE AND POTASSIUM CHLORIDE: .9; .15 SOLUTION INTRAVENOUS at 11:40

## 2020-12-19 RX ADMIN — HYDROMORPHONE HYDROCHLORIDE 0.2 MG: 1 INJECTION, SOLUTION INTRAMUSCULAR; INTRAVENOUS; SUBCUTANEOUS at 10:51

## 2020-12-19 RX ADMIN — SUCCINYLCHOLINE CHLORIDE 120 MG: 20 INJECTION, SOLUTION INTRAMUSCULAR; INTRAVENOUS at 08:20

## 2020-12-19 RX ADMIN — DEXAMETHASONE SODIUM PHOSPHATE 4 MG: 4 INJECTION, SOLUTION INTRA-ARTICULAR; INTRALESIONAL; INTRAMUSCULAR; INTRAVENOUS; SOFT TISSUE at 21:58

## 2020-12-19 RX ADMIN — HYDROMORPHONE HYDROCHLORIDE 0.2 MG: 1 INJECTION, SOLUTION INTRAMUSCULAR; INTRAVENOUS; SUBCUTANEOUS at 10:40

## 2020-12-19 RX ADMIN — BENZOCAINE AND MENTHOL 1 LOZENGE: 15; 3.6 LOZENGE ORAL at 15:08

## 2020-12-19 RX ADMIN — FENTANYL CITRATE 25 MCG: 50 INJECTION, SOLUTION INTRAMUSCULAR; INTRAVENOUS at 10:09

## 2020-12-19 RX ADMIN — CEFAZOLIN SODIUM 2 G: 1 INJECTION, POWDER, FOR SOLUTION INTRAMUSCULAR; INTRAVENOUS at 14:58

## 2020-12-19 RX ADMIN — POTASSIUM CHLORIDE AND SODIUM CHLORIDE: 900; 150 INJECTION, SOLUTION INTRAVENOUS at 11:40

## 2020-12-19 RX ADMIN — FENTANYL CITRATE 50 MCG: 50 INJECTION, SOLUTION INTRAMUSCULAR; INTRAVENOUS at 08:18

## 2020-12-19 RX ADMIN — MORPHINE SULFATE 2 MG: 2 INJECTION, SOLUTION INTRAMUSCULAR; INTRAVENOUS at 00:28

## 2020-12-19 RX ADMIN — DEXAMETHASONE SODIUM PHOSPHATE 4 MG: 4 INJECTION, SOLUTION INTRA-ARTICULAR; INTRALESIONAL; INTRAMUSCULAR; INTRAVENOUS; SOFT TISSUE at 07:25

## 2020-12-19 RX ADMIN — ROCURONIUM BROMIDE 10 MG: 10 SOLUTION INTRAVENOUS at 09:21

## 2020-12-19 RX ADMIN — DEXMEDETOMIDINE HYDROCHLORIDE 10 MCG: 100 INJECTION, SOLUTION, CONCENTRATE INTRAVENOUS at 09:53

## 2020-12-19 RX ADMIN — DIPHENHYDRAMINE HYDROCHLORIDE 25 MG: 50 INJECTION, SOLUTION INTRAMUSCULAR; INTRAVENOUS at 15:08

## 2020-12-19 RX ADMIN — PROPOFOL 180 MG: 10 INJECTION, EMULSION INTRAVENOUS at 08:19

## 2020-12-19 RX ADMIN — SODIUM CHLORIDE, POTASSIUM CHLORIDE, SODIUM LACTATE AND CALCIUM CHLORIDE: 600; 310; 30; 20 INJECTION, SOLUTION INTRAVENOUS at 07:55

## 2020-12-19 RX ADMIN — MORPHINE SULFATE 2 MG: 2 INJECTION, SOLUTION INTRAMUSCULAR; INTRAVENOUS at 21:58

## 2020-12-19 RX ADMIN — ROCURONIUM BROMIDE 10 MG: 10 SOLUTION INTRAVENOUS at 08:19

## 2020-12-19 RX ADMIN — Medication 10 ML: at 07:25

## 2020-12-19 RX ADMIN — AMITRIPTYLINE HYDROCHLORIDE 25 MG: 25 TABLET, FILM COATED ORAL at 23:53

## 2020-12-19 RX ADMIN — HYDROMORPHONE HYDROCHLORIDE 0.2 MG: 1 INJECTION, SOLUTION INTRAMUSCULAR; INTRAVENOUS; SUBCUTANEOUS at 10:30

## 2020-12-19 RX ADMIN — FENTANYL CITRATE 50 MCG: 50 INJECTION, SOLUTION INTRAMUSCULAR; INTRAVENOUS at 08:19

## 2020-12-19 RX ADMIN — CEFAZOLIN 2 G: 330 INJECTION, POWDER, FOR SOLUTION INTRAMUSCULAR; INTRAVENOUS at 08:35

## 2020-12-19 RX ADMIN — DEXAMETHASONE SODIUM PHOSPHATE 4 MG: 4 INJECTION, SOLUTION INTRA-ARTICULAR; INTRALESIONAL; INTRAMUSCULAR; INTRAVENOUS; SOFT TISSUE at 14:59

## 2020-12-19 RX ADMIN — HYDROMORPHONE HYDROCHLORIDE 1 MG: 2 INJECTION, SOLUTION INTRAMUSCULAR; INTRAVENOUS; SUBCUTANEOUS at 08:40

## 2020-12-19 RX ADMIN — FENTANYL CITRATE 25 MCG: 50 INJECTION, SOLUTION INTRAMUSCULAR; INTRAVENOUS at 10:21

## 2020-12-19 RX ADMIN — LIDOCAINE HYDROCHLORIDE 80 MG: 20 INJECTION, SOLUTION EPIDURAL; INFILTRATION; INTRACAUDAL; PERINEURAL at 08:19

## 2020-12-19 RX ADMIN — ONDANSETRON HYDROCHLORIDE 4 MG: 2 INJECTION, SOLUTION INTRAMUSCULAR; INTRAVENOUS at 08:24

## 2020-12-19 RX ADMIN — MIDAZOLAM 2 MG: 1 INJECTION INTRAMUSCULAR; INTRAVENOUS at 08:15

## 2020-12-19 RX ADMIN — HYDROMORPHONE HYDROCHLORIDE 0.2 MG: 1 INJECTION, SOLUTION INTRAMUSCULAR; INTRAVENOUS; SUBCUTANEOUS at 11:12

## 2020-12-19 RX ADMIN — SUGAMMADEX 200 MG: 100 INJECTION, SOLUTION INTRAVENOUS at 09:44

## 2020-12-19 RX ADMIN — DEXAMETHASONE SODIUM PHOSPHATE 4 MG: 4 INJECTION, SOLUTION INTRAMUSCULAR; INTRAVENOUS at 08:24

## 2020-12-19 RX ADMIN — SUCRALFATE 1 G: 1 TABLET ORAL at 23:08

## 2020-12-19 RX ADMIN — ROCURONIUM BROMIDE 40 MG: 10 SOLUTION INTRAVENOUS at 08:24

## 2020-12-19 RX ADMIN — DEXMEDETOMIDINE HYDROCHLORIDE 10 MCG: 100 INJECTION, SOLUTION, CONCENTRATE INTRAVENOUS at 08:44

## 2020-12-19 RX ADMIN — MIDAZOLAM 0.5 MG: 1 INJECTION INTRAMUSCULAR; INTRAVENOUS at 11:18

## 2020-12-19 RX ADMIN — HYDROMORPHONE HYDROCHLORIDE 0.2 MG: 1 INJECTION, SOLUTION INTRAMUSCULAR; INTRAVENOUS; SUBCUTANEOUS at 11:01

## 2020-12-19 NOTE — ROUTINE PROCESS
Bedside shift change report given to [address] (oncoming nurse) by Bertha Frey RN(offgoing nurse). Report given with SBAR.

## 2020-12-19 NOTE — PROGRESS NOTES
Hospitalist Progress Note  Tucker Romero MD  Answering service: [phone] from in house phone      Date of Service:  2020  NAME:  Beacher Lesch  :  1985  MRN:  [id number]      Admission Summary:   Beacher Lesch is a 30 y.o. female with a past medical history of C5-C6 cervical fusion in [place]. In 2015, anxiety, asthma, and eosinophilic esophagitis who presents with left-sided neck pain with radiation down her left arm. Evaluated in the ED, and reportedly underwent imaging that was normal.  She was treated with Toradol, Valium, and steroids with no relief. He denies any acute injuries, or change in physical activity. She denies upper or lower extremity paresthesias, or weakness.     In the ED, shoulder x-ray was normal.  Cervical spine MRI showed mild canal, and severe left foraminal stenosis at C5-C6. Dr. Lali Alvarez from neurosurgery was consulted, and recommended steroids with pain control, and will see patient as a consultation. Interval history / Subjective:      Patient seen and examined this afternoon. Patient seen post procedure. She has tolerated the procedure will wo an event. Now she is c/o pain and itching. \"My whole body itches\". Assessment & Plan:     # Left C5-C6 severe left subarticular stenosis with cervical radicular pain  hx C5-C6 fusion in  in [place], now with failed outpatient treatment.   - CT Left lateral and subarticular disc herniation at C5-6 with severe left  subarticular stenosis.   - C-spine MRI with mild canal, and severe left foraminal stenosis  - s/p C5-6 anterior cervical diskectomy w/ instrumental fusion   - on IV steroid, IV pain medication   - Neurosurgery following   - PT/OT tomorrow        Code: Full   MPOA: Amy Fuelling [phone]  dvt ppx: SCD     FUNCTIONAL STATUS PRIOR TO HOSPITALIZATION Ambulates Independently      Hospital Problems  Date Reviewed: 2020          Codes Class Noted POA    Pain ICD-10-CM: R52  ICD-9-CM: 780.96  12/18/2020 Unknown                Review of Systems:   Pertinent positive mentioned in interval hx/HPI. Other systems reviewed and negative     Vital Signs:    Last 24hrs VS reviewed since prior progress note. Most recent are:  Visit Vitals  /67   Pulse 88   Temp 98.5 °F (36.9 °C)   Resp 16   Ht 5' 2\" (1.575 m)   Wt 80.7 kg (178 lb)   SpO2 96%   BMI 32.56 kg/m²         Intake/Output Summary (Last 24 hours) at 12/19/2020 1521  Last data filed at 12/19/2020 1120  Gross per 24 hour   Intake 900 ml   Output 20 ml   Net 880 ml        Physical Examination:             Constitutional:  No acute distress, On neck collar    ENT:  Oral mucous moist, oropharynx benign. Neck supple,    Resp:  CTA bilaterally. No wheezing/rhonchi/rales. No accessory muscle use   CV:  Regular rhythm, normal rate, no murmurs, gallops, rubs    GI:  Soft, non distended, non tender. normoactive bowel sounds, no hepatosplenomegaly     Musculoskeletal:  No edema, warm, 2+ pulses throughout    Neurologic:  Cervical vertebral tenderness with radicular pain, limited active and passive motion of LUE pain which seems better than yesterday, Otherwise Moves all extremities. AAOx3, CN II-XII reviewed           Data Review:   I personally reviewed labs and imaging     Labs:     Recent Labs     12/18/20  1715   WBC 11.8*   HGB 13.6   HCT 41.0   *     Recent Labs     12/19/20  0428 12/18/20  1715   * 134*   K 4.4 4.2    105   CO2 22 24   BUN 12 13   CREA 0.60 0.89   * 169*   CA 9.1 8.6     No results for input(s): ALT, AP, TBIL, TBILI, TP, ALB, GLOB, GGT, AML, LPSE in the last 72 hours. No lab exists for component: SGOT, GPT, AMYP, HLPSE  Recent Labs     12/19/20  0428   INR 1.0   PTP 10.3      No results for input(s): FE, TIBC, PSAT, FERR in the last 72 hours. No results found for: FOL, RBCF   No results for input(s): PH, PCO2, PO2 in the last 72 hours.   No results for input(s): CPK, CKNDX, TROIQ in the last 72 hours. No lab exists for component: CPKMB  No results found for: CHOL, CHOLX, CHLST, CHOLV, HDL, HDLP, LDL, LDLC, DLDLP, TGLX, TRIGL, TRIGP, CHHD, CHHDX  No results found for: GLUCPOC  No results found for: COLOR, APPRN, SPGRU, REFSG, MARTIN, PROTU, GLUCU, KETU, BILU, UROU, FRANCISCO, LEUKU, GLUKE, EPSU, BACTU, WBCU, RBCU, CASTS, UCRY      Medications Reviewed:     Current Facility-Administered Medications   Medication Dose Route Frequency    amitriptyline (ELAVIL) tablet 25 mg  25 mg Oral QHS    pantoprazole (PROTONIX) tablet 40 mg  40 mg Oral BID    ceFAZolin (ANCEF) 2 g in sterile water (preservative free) 20 mL IV syringe  2 g IntraVENous Q8H    benzocaine-menthoL (CEPACOL) lozenge 1 Lozenge  1 Lozenge Mucous Membrane PRN    0.9% sodium chloride with KCl 20 mEq/L infusion   IntraVENous CONTINUOUS    diphenhydrAMINE (BENADRYL) injection 25 mg  25 mg IntraVENous Q4H PRN    sodium chloride (NS) flush 5-40 mL  5-40 mL IntraVENous Q8H    sodium chloride (NS) flush 5-40 mL  5-40 mL IntraVENous PRN    acetaminophen (TYLENOL) tablet 650 mg  650 mg Oral Q6H PRN    Or    acetaminophen (TYLENOL) suppository 650 mg  650 mg Rectal Q6H PRN    [Held by provider] ketorolac (TORADOL) injection 15 mg  15 mg IntraVENous Q6H PRN    morphine injection 2 mg  2 mg IntraVENous Q4H PRN    oxyCODONE IR (ROXICODONE) tablet 10 mg  10 mg Oral Q4H PRN    dexamethasone (DECADRON) 4 mg/mL injection 4 mg  4 mg IntraVENous Q8H    cyclobenzaprine (FLEXERIL) tablet 10 mg  10 mg Oral TID PRN     ______________________________________________________________________  EXPECTED LENGTH OF STAY: - - -  ACTUAL LENGTH OF STAY:          0                 Brianna Hdez MD   Patient has given Verbal permission to discuss medical care with   persons present in the room and and also with contact as listed on face sheet.      Please note that this dictation was completed with [employer], the computer voice recognition software. Quite often unanticipated grammatical, syntax, homophones, and other interpretive errors are inadvertently transcribed by the computer software. Please disregard these errors. Please excuse any errors that have escaped final proofreading. Thank you.

## 2020-12-19 NOTE — ANESTHESIA PREPROCEDURE EVALUATION
Relevant Problems   NEUROLOGY   (+) Depression, major, recurrent, mild (HCC)       Anesthetic History   No history of anesthetic complications            Review of Systems / Medical History  Patient summary reviewed, nursing notes reviewed and pertinent labs reviewed    Pulmonary            Asthma        Neuro/Psych         Psychiatric history     Cardiovascular  Within defined limits                     GI/Hepatic/Renal     GERD           Endo/Other  Within defined limits           Other Findings              Physical Exam    Airway  Mallampati: II  TM Distance: > 6 cm  Neck ROM: normal range of motion   Mouth opening: Normal     Cardiovascular  Regular rate and rhythm,  S1 and S2 normal,  no murmur, click, rub, or gallop             Dental  No notable dental hx       Pulmonary  Breath sounds clear to auscultation               Abdominal  GI exam deferred       Other Findings            Anesthetic Plan    ASA: 2  Anesthesia type: general          Induction: Intravenous  Anesthetic plan and risks discussed with: Patient

## 2020-12-19 NOTE — OP NOTES
[address]   OPERATIVE REPORT    Name:  Malathi Hernandez  MR#:  [id number]  :  1985  ACCOUNT #:  [de-identified]  DATE OF SERVICE:  2020    PREOPERATIVE DIAGNOSES:  Cervical disk herniation in the left at C5-6 with acute severe radiculopathy and previous fusion C6-7. POSTOPERATIVE DIAGNOSES:  Cervical disk herniation in the left at C5-6 with acute severe radiculopathy and previous fusion C6-7. PROCEDURES PERFORMED:  C5-6 anterior cervical diskectomy with instrumented fusion using Synthes Zero profile structural allograft and screws, use of operating microscope. SURGEON:  Sarahi Gutierrez MD    ASSISTANT:  Silverio Dominguez SA    ANESTHESIA:  General endotracheal anesthesia. COMPLICATIONS:  None. SPECIMENS REMOVED:  None. IMPLANTS:  As noted above. ESTIMATED BLOOD LOSS:  Less than 50 mL. OPERATIVE INDICATIONS:  This is a 61-year-old female with previous C6-7 fusion done in [place] approximately 12 years ago. She presented with severe excruciating left arm pain, weakness and numbness, refractory to medical care. MRI showed a large disk herniation in the left foramen paracentrally at C5-6 above the fusion. After discussing treatment options and risks of surgery, informed consent was obtained. OPERATION IN DETAIL:  The patient was taken to the operating room, placed under general endotracheal anesthesia. All necessary lines and monitors were placed. She was given appropriate dose of IV antibiotics. SCDs were placed. She was placed supine on the operating room table. All pressure points were padded. Neck in gentle extension. Shoulders were taped down. The anterior cervical region was prepped and draped in standard sterile fashion. Incision was made from the midline to the right sternocleidomastoid with a skin knife, carried down with Bovie electrocautery through subcutaneous tissue. Platysma was undermined rostrally and caudally.   The plane medial to sternocleidomastoid was carried down to the prevertebral space via sharp and blunt dissection. There was significant scar tissue in the prevertebral space. I carefully dissected trachea and esophagus medially and carotid sheath laterally. Prevertebral space was cleaned off. I identified the previous plate, the top of which was abutting the C5-6 disk space. This was cleaned off of soft tissue and inferior C5 vertebral body was cleaned off. Longus colli were undermined bilaterally at C5-6 and self-retaining retractors were placed. Annulotomy was performed at C5-6. Pituitaries used to remove superficial disk and rongeurs  to remove the osteophytes. The operating scope was brought into the field. Underneath the microscope, the complete diskectomy was performed. There was a significant left paracentral disk herniation and then a subligamentous foraminal herniation. The posterior longitudinal ligament was opened and widely decompressed. There was a large fragmented disk out in the foramen. This was removed. The left C6 nerve root was fully skeletonized and palpated and visualized to be free, going up the foramen. I decompressed over onto the left side and the left foramen as well as osteophytes. The endplates were decorticated. Scope was taken out of the field. I then used Synthes Zero profile device, using an 8-mm height lordotic spacer with 14-mm screws, one going rostrally, one going caudally. The screws had good purchase and then the locking mechanisms were engaged. The fluoroscopy was used which showed good position of the hardware on AP and lateral fluoroscopy. The wound was copiously irrigated with antibiotic solution. Hemostasis was achieved. The wound was then closed with inverted 2-0 Vicryl to close the platysma, running 4-0 Monocryl in a subcuticular fashion. Wounds were cleaned and dried and dressed with sterile dressing.   The patient was placed in a collar, extubated, taken to recovery room in stable condition.         MD VIRIDIANA [id number]  D:  12/19/2020 9:47  T:  12/19/2020 15:07  JOB #:  [id number]  CC:  Shaun Aly MD

## 2020-12-19 NOTE — PROGRESS NOTES
Bedside and Verbal shift change report given to Adis RN (oncoming nurse) by Christopher Evans RN (offgoing nurse). Report included the following information SBAR, ED Summary, MAR and Recent Results.

## 2020-12-19 NOTE — PERIOP NOTES
Babita Hoang TRANSFER - OUT REPORT:    Verbal report given to [address] RN(name) on [place]  being transferred to [facility](unit) for routine post - op       Report consisted of patients Situation, Background, Assessment and   Recommendations(SBAR). Time Pre op antibiotic SIGYK:2988  Anesthesia Stop time: 8480      Information from the following report(s) SBAR, Procedure Summary, Intake/Output and MAR was reviewed with the receiving nurse. Opportunity for questions and clarification was provided. Is the patient on 02? NO          Is the patient on a monitor? NO    Is the nurse transporting with the patient? NO    Surgical Waiting Area notified of patient's transfer from PACU?  NO      The following personal items collected during your admission accompanied patient upon transfer:   Dental Appliance: Dental Appliances: None  Vision:    Hearing Aid:    Jewelry:    Clothing:    Other Valuables:    Valuables sent to safe:

## 2020-12-19 NOTE — ANESTHESIA POSTPROCEDURE EVALUATION
Post-Anesthesia Evaluation and Assessment    Patient: Kori Summers MRN: [id number]  SSN: [id number]    YOB: 1985  Age: 30 y.o. Sex: female      I have evaluated the patient and they are stable and ready for discharge from the PACU. Cardiovascular Function/Vital Signs  Visit Vitals  /88   Pulse 83   Temp 36.8 °C (98.2 °F)   Resp 14   Ht 5' 2\" (1.575 m)   Wt 80.7 kg (178 lb)   SpO2 98%   BMI 32.56 kg/m²       Patient is status post General anesthesia for Procedure(s):  SPINE ANTERIOR CERVICAL FUSION WITH INSTRUMENTATION C5-C6, SYNTHES ZERO POINT INSTRUMENTATION, MICROSCOPE. Nausea/Vomiting: None    Postoperative hydration reviewed and adequate. Pain:  Pain Scale 1: Numeric (0 - 10) (12/19/20 0800)  Pain Intensity 1: 8 (12/19/20 0800)   Managed    Neurological Status: At baseline    Mental Status, Level of Consciousness: Alert and  oriented to person, place, and time    Pulmonary Status:   O2 Device: Nasal cannula (12/19/20 1001)   Adequate oxygenation and airway patent    Complications related to anesthesia: None    Post-anesthesia assessment completed. No concerns    Signed By: Carolyn Dawkins MD     December 19, 2020              Procedure(s):  SPINE ANTERIOR CERVICAL FUSION WITH INSTRUMENTATION C5-C6, SYNTHES ZERO POINT INSTRUMENTATION, MICROSCOPE.    general    <BSHSIANPOST>    INITIAL Post-op Vital signs:   Vitals Value Taken Time   /76 12/19/20 1010   Temp 36.8 °C (98.2 °F) 12/19/20 1001   Pulse 79 12/19/20 1012   Resp 11 12/19/20 1012   SpO2 91 % 12/19/20 1012   Vitals shown include unvalidated device data.

## 2020-12-19 NOTE — BRIEF OP NOTE
Brief Postoperative Note    Patient: Foreign Peters  YOB: 1985  MRN: [id number]    Date of Procedure: 12/19/2020     Pre-Op Diagnosis: CERVICAL DISC DISEASE    Post-Op Diagnosis: Same as preoperative diagnosis. Procedure(s):  SPINE ANTERIOR CERVICAL FUSION WITH INSTRUMENTATION C5-C6, SYNTHES ZERO POINT INSTRUMENTATION, MICROSCOPE    Surgeon(s):  Joseline Abdi MD    Surgical Assistant: Surg Asst-1: Silverio Dominguez    Anesthesia: General     Estimated Blood Loss (mL): less than 50     Complications: None    Specimens: * No specimens in log *     Implants:   Implant Name Type Inv. Item Serial No.  Lot No. LRB No. Used Action   GRAFT BONE SUB 1CC DEMIN BONE MTRX PRIM HD - [id number]  GRAFT BONE SUB 1CC DEMIN BONE MTRX PRIM HD [id number] [employer] N/A N/A 1 Implanted   SPACER SPNL H8MM STD ANT CERV INTBDY FUS PEEK OPTMA TI ALLY [[id number]] [DEPUY SYNTHES [place]] - SN/A  SPACER SPNL H8MM STD ANT CERV INTBDY FUS PEEK OPTMA TI ALLY [[id number]] [DEPUY SYNTHES [place]] N/A DEPUY SYNTHES [place]_WD [id number] N/A 1 Implanted   SCREW SPNL L14MM DIA3. 7MM G ANT CERV TI SELF DRL ZERO-P VA - SNA  SCREW SPNL L14MM DIA3. 7MM G ANT CERV TI SELF DRL ZERO-P VA NA DEPUY SYNTHES [place]_WD NA N/A 2 Implanted       Drains: * No LDAs found *    Findings: c5-6 hnp    Electronically Signed by Sarahi Gutierrez MD on 12/19/2020 at 9:35 AM

## 2020-12-20 LAB
ERYTHROCYTE [DISTWIDTH] IN BLOOD BY AUTOMATED COUNT: 13.3 % (ref 11.5–14.5)
HCT VFR BLD AUTO: 38.1 % (ref 35–47)
HGB BLD-MCNC: 12.6 G/DL (ref 11.5–16)
MCH RBC QN AUTO: 27.1 PG (ref 26–34)
MCHC RBC AUTO-ENTMCNC: 33.1 G/DL (ref 30–36.5)
MCV RBC AUTO: 81.9 FL (ref 80–99)
NRBC # BLD: 0 K/UL (ref 0–0.01)
NRBC BLD-RTO: 0 PER 100 WBC
PLATELET # BLD AUTO: 362 K/UL (ref 150–400)
PMV BLD AUTO: 8.6 FL (ref 8.9–12.9)
RBC # BLD AUTO: 4.65 M/UL (ref 3.8–5.2)
WBC # BLD AUTO: 19.1 K/UL (ref 3.6–11)

## 2020-12-20 PROCEDURE — 85027 COMPLETE CBC AUTOMATED: CPT

## 2020-12-20 PROCEDURE — 74011250636 HC RX REV CODE- 250/636: Performed by: NEUROLOGICAL SURGERY

## 2020-12-20 PROCEDURE — 99218 HC RM OBSERVATION: CPT

## 2020-12-20 PROCEDURE — 97161 PT EVAL LOW COMPLEX 20 MIN: CPT

## 2020-12-20 PROCEDURE — 97116 GAIT TRAINING THERAPY: CPT

## 2020-12-20 PROCEDURE — 36415 COLL VENOUS BLD VENIPUNCTURE: CPT

## 2020-12-20 PROCEDURE — 74011250637 HC RX REV CODE- 250/637: Performed by: NEUROLOGICAL SURGERY

## 2020-12-20 PROCEDURE — 74011250637 HC RX REV CODE- 250/637: Performed by: INTERNAL MEDICINE

## 2020-12-20 PROCEDURE — 96376 TX/PRO/DX INJ SAME DRUG ADON: CPT

## 2020-12-20 PROCEDURE — 97165 OT EVAL LOW COMPLEX 30 MIN: CPT

## 2020-12-20 PROCEDURE — 94760 N-INVAS EAR/PLS OXIMETRY 1: CPT

## 2020-12-20 RX ORDER — PREDNISONE 20 MG/1
40 TABLET ORAL
Qty: 10 TAB | Refills: 0 | Status: SHIPPED | OUTPATIENT
Start: 2020-12-20 | End: 2020-12-25

## 2020-12-20 RX ORDER — DEXAMETHASONE SODIUM PHOSPHATE 4 MG/ML
4 INJECTION, SOLUTION INTRA-ARTICULAR; INTRALESIONAL; INTRAMUSCULAR; INTRAVENOUS; SOFT TISSUE EVERY 8 HOURS
Status: DISCONTINUED | OUTPATIENT
Start: 2020-12-20 | End: 2020-12-21 | Stop reason: HOSPADM

## 2020-12-20 RX ORDER — CYCLOBENZAPRINE HCL 10 MG
10 TABLET ORAL
Qty: 15 TAB | Refills: 0 | Status: SHIPPED | OUTPATIENT
Start: 2020-12-20 | End: 2020-12-25

## 2020-12-20 RX ORDER — OXYCODONE HYDROCHLORIDE 10 MG/1
10 TABLET ORAL
Qty: 20 TAB | Refills: 0 | Status: SHIPPED | OUTPATIENT
Start: 2020-12-20 | End: 2020-12-25

## 2020-12-20 RX ADMIN — SUCRALFATE 1 G: 1 TABLET ORAL at 06:43

## 2020-12-20 RX ADMIN — SUCRALFATE 1 G: 1 TABLET ORAL at 17:03

## 2020-12-20 RX ADMIN — OXYCODONE 10 MG: 5 TABLET ORAL at 18:37

## 2020-12-20 RX ADMIN — OXYCODONE 10 MG: 5 TABLET ORAL at 22:47

## 2020-12-20 RX ADMIN — SUCRALFATE 1 G: 1 TABLET ORAL at 22:47

## 2020-12-20 RX ADMIN — MORPHINE SULFATE 2 MG: 2 INJECTION, SOLUTION INTRAMUSCULAR; INTRAVENOUS at 12:59

## 2020-12-20 RX ADMIN — DEXAMETHASONE SODIUM PHOSPHATE 4 MG: 4 INJECTION, SOLUTION INTRA-ARTICULAR; INTRALESIONAL; INTRAMUSCULAR; INTRAVENOUS; SOFT TISSUE at 06:43

## 2020-12-20 RX ADMIN — BENZOCAINE AND MENTHOL 1 LOZENGE: 15; 3.6 LOZENGE ORAL at 13:50

## 2020-12-20 RX ADMIN — MORPHINE SULFATE 2 MG: 2 INJECTION, SOLUTION INTRAMUSCULAR; INTRAVENOUS at 06:43

## 2020-12-20 RX ADMIN — SUCRALFATE 1 G: 1 TABLET ORAL at 10:40

## 2020-12-20 RX ADMIN — PANTOPRAZOLE SODIUM 40 MG: 40 TABLET, DELAYED RELEASE ORAL at 17:03

## 2020-12-20 RX ADMIN — AMITRIPTYLINE HYDROCHLORIDE 25 MG: 25 TABLET, FILM COATED ORAL at 22:47

## 2020-12-20 RX ADMIN — MORPHINE SULFATE 2 MG: 2 INJECTION, SOLUTION INTRAMUSCULAR; INTRAVENOUS at 02:23

## 2020-12-20 NOTE — PROGRESS NOTES
Occupational Therapy    Order's acknowledged, chart reviewed in prep for OT evaluation, spoke with patient who declines OT needs at this time, with pt and Physical Therapist verbalizing pt is Independent with toileting, with no difficulties walking to/from bathroom, with pt reporting all AE needs fulfilled and PRN ADL/IADL assist available within home, with pt demonstrating good functional awareness to spinal precautions. Given pt's current high level of ADL independence, AE/DME needs fulfilled and good PRN assist available within home, no acute OT needs identified, with OT to sign off. Pt thanked therapist for his efforts. Of note, pt with c/o difficulty clearing throat 2/2 pain; nursing notified and following.     Thank you,  Sophia Whalen, OT

## 2020-12-20 NOTE — PROGRESS NOTES
Doing well. Arm pain gone. Strength returning. Sore throat but able to get some po. Ok to d/c from my standpt if don po.   F/up in office in 2 weeks

## 2020-12-20 NOTE — PROGRESS NOTES
Bedside shift change report given to Everett Higginbotham RN (oncoming nurse) by Sangeeta Mcdowell (offgoing nurse). Report included the following information SBAR, Kardex, Procedure Summary, Intake/Output, MAR and Recent Results.

## 2020-12-20 NOTE — PROGRESS NOTES
Bedside and Verbal shift change report given to Vmasi Torres (oncoming nurse) by Claudean December (offgoing nurse). Report included the following information SBAR, Kardex and MAR.

## 2020-12-20 NOTE — ROUTINE PROCESS
Bedside shift change report given to Bo WestRN (oncoming nurse) by Loni Dykes RN(offgoing nurse). Report given with SBAR.

## 2020-12-20 NOTE — PROGRESS NOTES
Re-checked with Dr. Cristina Person whether to re-establish IV access in the event that she had more morphine. Was advised that there is no need and oral analgesia should be sufficient.

## 2020-12-20 NOTE — PROGRESS NOTES
Pt was unable to swallow oxycodone pills and gagged them up, also complaining of pain in the sternum when she takes deep breaths or reaches her arms across her chest. Physical assessment is unremarkable. Alerted Dr. Pietro Mayorga through [employer] and she states that she will assess the pt soon.

## 2020-12-20 NOTE — PROGRESS NOTES
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 HOME SUPPORT PRIOR TO ADMISSION: The patient lived with mother, brother , children  but did not require assist.    Physical Therapy Goals  Initiated 12/20/2020    1. Patient will move from supine to sit and sit to supine , scoot up and down, and roll side to side in bed with modified independence (log roll) within 4 days. 2. Patient will perform sit to stand with modified independence within 4 days. 3. Patient will ambulate with modified independence for > 200 feet with the least restrictive device within 4 days. 4. Patient will ascend/descend 4 stairs with 1 handrail(s) with supervision/set-up within 4 days. 5. Patient will verbalize and demonstrate understanding of spinal precautions (No bending, lifting greater than 5 lbs, or twisting; log-roll technique; frequent repositioning as instructed. Soft cervical collar when OOB) within 4 days. PHYSICAL THERAPY EVALUATION  Patient: Sofia Monk (67 y.o. female)  Date: 12/20/2020  Primary Diagnosis: Pain [R52]  Procedure(s) (LRB):  SPINE ANTERIOR CERVICAL FUSION WITH INSTRUMENTATION C5-C6, SYNTHES ZERO POINT INSTRUMENTATION, MICROSCOPE (N/A) 1 Day Post-Op   Precautions:  Spinal, Fall   No bending, no lifting greater than 5 lbs, no twisting, log-roll technique, repositioning every 20-30 min except when sleeping, soft cervical collar when OOB     ASSESSMENT  Based on the objective data described below, the patient presents POD #1 ACDF C5-C6 with cervical pain, new spinal precautions, and decreased activity tolerance. Pt mobilizing without difficulty - no dizziness - up to bathroom with nursing supervision. Pt able to amb in hallway and perform stairs - gait slow but steady.   Pt is cleared for discharge from PT standpoint - will continue to follow to increase endurance and reinforce precautions. Current Level of Function Impacting Discharge (mobility/balance): Standby to supervision for bed mobility, transfers and amb - including stairs with one rail    Functional Outcome Measure: The patient scored 27/18 on the TInetti outcome measure which is indicative of low fall risk. Other factors to consider for discharge: spinal precautions familiar to pt from previous cervical surgery; multiple family members in home to assist     Patient will benefit from skilled therapy intervention to address the above noted impairments. PLAN :  Recommendations and Planned Interventions: bed mobility training, transfer training, gait training, therapeutic exercises, patient and family training/education, and therapeutic activities      Frequency/Duration: Patient will be followed by physical therapy:  twice daily to address goals. Recommendation for discharge: (in order for the patient to meet his/her long term goals)  No skilled physical therapy/ follow up rehabilitation needs identified at this time. This discharge recommendation:  Has been made in collaboration with the attending provider and/or case management    IF patient discharges home will need the following DME: none         SUBJECTIVE:   Patient stated my left arm feels much better - I'm not having anymore shooting pain.     OBJECTIVE DATA SUMMARY:   HISTORY:    Past Medical History:   Diagnosis Date    Anxiety     Asthma     no medication    EE (eosinophilic esophagitis)      Past Surgical History:   Procedure Laterality Date    HX CHOLECYSTECTOMY      IR BX NECK  / THORAX TISSUE      benign    NEUROLOGICAL PROCEDURE UNLISTED      neck fusion       Personal factors and/or comorbidities impacting plan of care: as above    Home Situation  [facility]  # Steps to Enter: 5  Rails to Enter: Yes  One/Two Story Residence: Other (Comment)(5 steps to apt level)  # of Interior Steps: 14  Interior Rails: Left  Living Alone: No  Support Systems: Family member(s)  Patient Expects to be Discharged to[de-identified] Apartment  Current DME Used/Available at Home: None  Tub or Shower Type: Tub/Shower combination    EXAMINATION/PRESENTATION/DECISION MAKING:   Critical Behavior:  Neurologic State: Alert  Orientation Level: Oriented X4  Cognition: Appropriate decision making, Appropriate for age attention/concentration  Safety/Judgement: Awareness of environment, Good awareness of safety precautions, Insight into deficits  Range Of Motion:  AROM: Within functional limits                       Strength:    Strength: Within functional limits                    Tone & Sensation:   Tone: Normal              Sensation: Intact               Coordination:  Coordination: Within functional limits  Functional Mobility:  Bed Mobility:  Rolling: Modified independent(instructed in log roll - able to perform with cues)  Supine to Sit: Modified independent(from right sidelying)  Sit to Supine: Modified independent     Transfers:  Sit to Stand: Stand-by assistance  Stand to Sit: Stand-by assistance                       Balance:   Sitting: Intact  Standing: Intact; Without support  Ambulation/Gait Training:  Distance (ft): 100 Feet (ft)  Assistive Device: Gait belt;Brace/Splint(soft cervical collar)  Ambulation - Level of Assistance: Stand-by assistance        Gait Abnormalities: Decreased step clearance(guarded, cervical precautions-no bending/twisting/soft colla)              Speed/Crystal: Slow  Step Length: Right shortened;Left shortened            Stairs:  Number of Stairs Trained: 4  Stairs - Level of Assistance: Stand-by assistance   Rail Use: Left       Functional Measure:  Tinetti test:    Sitting Balance: 1  Arises: 2  Attempts to Rise: 2  Immediate Standing Balance: 2  Standing Balance: 2  Nudged: 2  Eyes Closed: 1  Turn 360 Degrees - Continuous/Discontinuous: 1  Turn 360 Degrees - Steady/Unsteady: 1  Sitting Down: 1  Balance Score: 15 Balance total score  Indication of Gait: 1  R Step Length/Height: 1  L Step Length/Height: 1  R Foot Clearance: 1  L Foot Clearance: 1  Step Symmetry: 1  Step Continuity: 1  Path: 2  Trunk: 2  Walking Time: 1  Gait Score: 12 Gait total score  Total Score: 27/28 Overall total score         Tinetti Tool Score Risk of Falls  <19 = High Fall Risk  19-24 = Moderate Fall Risk  25-28 = Low Fall Risk  Tinetti ME. Performance-Oriented Assessment of Mobility Problems in Elderly Patients. [facility]; [id number]. (Scoring Description: PT Bulletin Feb. 10, 1993)    Older adults: Pastor Pena et al, 2009; n = 1000 [facility] elderly evaluated with ABC, FARAZ, ADL, and IADL)  · Mean FARAZ score for males aged 69-68 years = 26.21(3.40)  · Mean FARAZ score for females age 69-68 years = 25.16(4.30)  · Mean FARAZ score for males over 80 years = 23.29(6.02)  · Mean FARAZ score for females over 80 years = 17.20(8.32)           Physical Therapy Evaluation Charge Determination   History Examination Presentation Decision-Making   LOW Complexity : Zero comorbidities / personal factors that will impact the outcome / POC LOW Complexity : 1-2 Standardized tests and measures addressing body structure, function, activity limitation and / or participation in recreation  LOW Complexity : Stable, uncomplicated  LOW Complexity : FOTO score of       Based on the above components, the patient evaluation is determined to be of the following complexity level: LOW     Pain Rating:  Neck pain at rest in bed - 8.5/10; following amb 9/10    Activity Tolerance:   Good - POD#1 - despite pain > 8/10 tolerating OOB/amb to bathroom/hallway with standby assist    After treatment patient left in no apparent distress:   Supine in bed and Call bell within reach    COMMUNICATION/EDUCATION:   The patients plan of care was discussed with: Registered nurse.      Fall prevention education was provided and the patient/caregiver indicated understanding., Patient/family have participated as able in goal setting and plan of care. , and Patient/family agree to work toward stated goals and plan of care.     Thank you for this referral.  Dylan Enciso, PT   Time Calculation: 25 mins

## 2020-12-20 NOTE — PROGRESS NOTES
Bedside shift change report given to Domo Moe (oncoming nurse) by Mendy Payne (offgoing nurse). Report included the following information SBAR, MAR and Recent Results.

## 2020-12-20 NOTE — PROGRESS NOTES
Pt IV infiltrated and was removed following IV morphine administration for 9/10 pain. She has reported severe difficulties with swallowing since the procedure. Dr. Diamond Cleary was notified and stated that pt would not need new IV access, and that we would try lozenges or throat spray to aid her in taking her oral medication to discharge.

## 2020-12-21 ENCOUNTER — APPOINTMENT (OUTPATIENT)
Dept: GENERAL RADIOLOGY | Age: 35
End: 2020-12-21
Attending: INTERNAL MEDICINE
Payer: MEDICAID

## 2020-12-21 VITALS
TEMPERATURE: 98.2 F | OXYGEN SATURATION: 96 % | WEIGHT: 178 LBS | HEIGHT: 62 IN | HEART RATE: 82 BPM | DIASTOLIC BLOOD PRESSURE: 81 MMHG | SYSTOLIC BLOOD PRESSURE: 118 MMHG | RESPIRATION RATE: 16 BRPM | BODY MASS INDEX: 32.76 KG/M2

## 2020-12-21 PROCEDURE — 99218 HC RM OBSERVATION: CPT

## 2020-12-21 PROCEDURE — 71045 X-RAY EXAM CHEST 1 VIEW: CPT

## 2020-12-21 PROCEDURE — 96372 THER/PROPH/DIAG INJ SC/IM: CPT

## 2020-12-21 PROCEDURE — 74011250637 HC RX REV CODE- 250/637: Performed by: PHYSICIAN ASSISTANT

## 2020-12-21 PROCEDURE — 97116 GAIT TRAINING THERAPY: CPT

## 2020-12-21 PROCEDURE — 70360 X-RAY EXAM OF NECK: CPT

## 2020-12-21 PROCEDURE — 74011250637 HC RX REV CODE- 250/637: Performed by: NURSE PRACTITIONER

## 2020-12-21 PROCEDURE — 74011250637 HC RX REV CODE- 250/637: Performed by: NEUROLOGICAL SURGERY

## 2020-12-21 PROCEDURE — 74011250636 HC RX REV CODE- 250/636: Performed by: NURSE PRACTITIONER

## 2020-12-21 RX ORDER — FAMOTIDINE 20 MG/1
20 TABLET, FILM COATED ORAL
Qty: 30 TAB | Refills: 0 | Status: SHIPPED | OUTPATIENT
Start: 2020-12-21 | End: 2021-01-20

## 2020-12-21 RX ORDER — DIPHENHYDRAMINE HCL 12.5MG/5ML
12.5 ELIXIR ORAL
Status: DISCONTINUED | OUTPATIENT
Start: 2020-12-21 | End: 2020-12-21 | Stop reason: HOSPADM

## 2020-12-21 RX ORDER — SUCRALFATE 1 G/1
1 TABLET ORAL 4 TIMES DAILY
Qty: 80 TAB | Refills: 0 | Status: SHIPPED | OUTPATIENT
Start: 2020-12-21 | End: 2021-01-10

## 2020-12-21 RX ORDER — DIPHENHYDRAMINE HCL 12.5MG/5ML
25 ELIXIR ORAL
Status: COMPLETED | OUTPATIENT
Start: 2020-12-21 | End: 2020-12-21

## 2020-12-21 RX ADMIN — OXYCODONE 10 MG: 5 TABLET ORAL at 06:42

## 2020-12-21 RX ADMIN — SUCRALFATE 1 G: 1 TABLET ORAL at 10:32

## 2020-12-21 RX ADMIN — DEXAMETHASONE SODIUM PHOSPHATE 4 MG: 4 INJECTION, SOLUTION INTRAMUSCULAR; INTRAVENOUS at 13:37

## 2020-12-21 RX ADMIN — DIPHENHYDRAMINE HYDROCHLORIDE 25 MG: 12.5 SOLUTION ORAL at 00:29

## 2020-12-21 RX ADMIN — OXYCODONE 10 MG: 5 TABLET ORAL at 10:32

## 2020-12-21 RX ADMIN — PANTOPRAZOLE SODIUM 40 MG: 40 TABLET, DELAYED RELEASE ORAL at 08:36

## 2020-12-21 RX ADMIN — DIPHENHYDRAMINE HYDROCHLORIDE 12.5 MG: 12.5 SOLUTION ORAL at 10:32

## 2020-12-21 RX ADMIN — SUCRALFATE 1 G: 1 TABLET ORAL at 17:18

## 2020-12-21 RX ADMIN — SUCRALFATE 1 G: 1 TABLET ORAL at 06:42

## 2020-12-21 NOTE — PROGRESS NOTES
Neurosurgery Progress Note  Chanel Block [place]      Admit Date: 2020    Daily Progress Note: 2020    S/P: POD# 2 C5-6 ACDF 20  Previous C6-7 ACDF    Subjective: The patient has a history of a previous ACDF at C6/7 5 years ago in [place]. Prior to that surgery, she had right upper extremity weakness and numbness. She did well after surgery. She recently developed neck pain and left upper extremity pain and it became severe 2 days ago. She went to [facility] ER and received Toradol, Valium, and a steroid pack. She still did not have good relief this morning, so she presented to the [facility] ER. She is having pain shooting down the left arm. On exam she was guarding her left arm and trying to keep it in a certain position to prevent the pain. She says she has tried gabapentin in the past on maximum dosage without relief. She says she gets some relief with roxicodone, but it does not last very long. After examination and workup, it was determined patient would benefit from surgery, she ultimately underwent C5-6 ACDF on 20. At present, patient states symptoms are largely resolved. Left arm is no longer painful. She is able to move it freely and denies numbness and tingling. Neck pain is well-controlled. She does note \"gasping\" and soreness with swallowing solids; she is able to tolerate liquids. Patient states she had an esophageal biopsy very recently and would like to see her GI physician prior to discharge given these symptoms. GI consult was placed. New onset itching with oxycodone noted as well. Patient denies chest pain, leg pain, nausea, vomiting, headache, and dyspnea. Objective:     Vital signs  Temp (24hrs), Av.1 °F (36.7 °C), Min:97.8 °F (36.6 °C), Max:98.2 °F (36.8 °C)   No intake/output data recorded.    1901 -  0700  In: 320 [P.O.:320]  Out: -     Visit Vitals  /81 (BP 1 Location: Right arm, BP Patient Position: At rest)   Pulse 82   Temp 98.2 °F (36.8 °C)   Resp 16   Ht 5' 2\" (1.575 m)   Wt 80.7 kg (178 lb)   SpO2 96%   BMI 32.56 kg/m²    O2 Flow Rate (L/min): 2 l/min O2 Device: Room air     Pain control  Pain Assessment  Pain Scale 1: Numeric (0 - 10)  Pain Intensity 1: 8  Pain Onset 1: acute  Pain Location 1: Neck  Pain Orientation 1: Anterior  Pain Description 1: Aching  Pain Intervention(s) 1: Medication (see MAR)    PT/OT  Gait     Gait  Speed/Crystal: Pace decreased (<100 feet/min)  Step Length: Left shortened, Right shortened  Gait Abnormalities: Decreased step clearance  Ambulation - Level of Assistance: Supervision, Stand-by assistance  Distance (ft): 50 Feet (ft)  Assistive Device: Gait belt  Rail Use: Left (light HHA opposite hand)  Stairs - Level of Assistance: Stand-by assistance, Assist X1  Number of Stairs Trained: 8(4 steps x2)         Physical Exam:  Gen: AxOx3. No acute distress. Patient lying comfortably in bed. Neuro: Follows commands. Speech clear. Affect normal.  CHAPMAN spontaneously. Strength 5/5 in BUE throughout. Gait deferred. MRI C-spine without contrast on 12/17/2020 shows disc degenerative changes most dense at C5-C6. There is mild canal and severe left foraminal stenosis at C5-6 with a moderate left lateral recess and left foraminal protrusion/osteophyte at C5-6. CT C-spine without contrast on 12/18/2020 shows C6-7 anterior cervical fixation with interbody graft placement. Solid osseous fusion of the centrally positioned graft is demonstrated. No disruption of the fixation device is shown. Left lateral and subarticular disc herniation at C5-6 with severe left subarticular stenosis. 24 hour results:    No results found for this or any previous visit (from the past 24 hour(s)). Assessment:     Active Problems:    Pain (12/18/2020)      Plan:   1.  Herniated cervical disc   - s/p C5-6 ACDF 12/19/20   - Pain control - roxicodone PRN, Flexeril PRN, tylenol PRN   - Benadryl PRN itching   - Soft cervical collar   - LUE symptoms resolved, pain well-controlled.     - Some pain & \"gasping for air\" with swallowing solids, able to tolerate liquids - pt had EGD with bx about 10 days ago; GI consulted   - OK to discharge from [address] [place] [address] standpoint if medically cleared by hospitalist and GI    Activity: as tolerated  DVT ppx: SCDs  Dispo: likely home today    Plan d/w Dr. Trent Langley and nurse    VIRIDIANA Ac

## 2020-12-21 NOTE — PROGRESS NOTES
Physical Therapy  12/21/2020    Chart reviewed and POC reviewed w/ rehab tech. Pt amb approx 80 Ft total w/ CGA and no use of assistive device. Noted pt hand hovering over her chest and she reports mild difficulty catching breath. She was able and willing to amb back to room and sat EOB where she reports issue resolving w/ rest. Pt's SpO2 on RA 99% and -106 bpm (decreasing to 99 bpm w/ continued rest). Pt w/ no further complaints and RN made aware. She reports similar difficulty when eating. GI had been consulted and pt reports she had been TRUE for imaging (chest xray).  Pt returned to bed w/ Matias 128, PTA

## 2020-12-21 NOTE — CONSULTS
[address] [facility] [place] NOTE  [facility] office  [phone] NP in-hospital cell phone M-F until 4:30  After 5pm or on weekends, please call  for physician on call        NAME:  Dois Severs   :   1985   MRN:   [id number]       Referring Physician: Estee Randall    Consult Date: 2020 10:45 AM    Chief Complaint: gasping for air after swallowing     History of Present Illness:  Patient is a 30 y. o. who is seen in consultation at the request of Dr. Estee Randall for patient insisted seeing GI, history of GERD with recent EGD. Medical history as listed below. Patient presented for radicular pain now status post cervical fusion. She reports since surgery she gasps for air when she swallows. She had recent EGD with EoE. She continues to have reflux and some trouble swallowing. She reports temporary improvement with addition of Carafate. No NSAID's or tobacco. Occasional alcohol. EGD 2020: 5cm hiatal hernia, grade 3 erosive esophagitis, diffuse changes (furrows and white specks) suggestive of EoE    I have reviewed the emergency room note, hospital admission note, notes by all other clinicians who have seen the patient during this hospitalization to date. I have reviewed the problem list and the reason for this hospitalization. I have reviewed the allergies and the medications the patient was taking at home prior to this hospitalization. PMH:  Past Medical History:   Diagnosis Date    Anxiety     Asthma     no medication    EE (eosinophilic esophagitis)        PSH:  Past Surgical History:   Procedure Laterality Date    HX CHOLECYSTECTOMY      IR BX NECK  / THORAX TISSUE      benign    NEUROLOGICAL PROCEDURE UNLISTED      neck fusion       Allergies:   Allergies   Allergen Reactions    Ativan [Lorazepam] Other (comments)     Patient unsure of reaction       Home Medications:  Prior to Admission Medications   Prescriptions Last Dose Informant Patient Reported? Taking?   amitriptyline (ELAVIL) 25 mg tablet   Yes No   Sig: Take 25 mg by mouth nightly as needed for Sleep. methocarbamoL (Robaxin) 500 mg tablet   Yes No   Sig: Take 500 mg by mouth two (2) times a day. pantoprazole (Protonix) 40 mg tablet   No No   Sig: Take 1 Tab by mouth two (2) times a day.       Facility-Administered Medications: None       Hospital Medications:  Current Facility-Administered Medications   Medication Dose Route Frequency    diphenhydrAMINE (BENADRYL) 12.5 mg/5 mL oral elixir 12.5 mg  12.5 mg Oral Q4H PRN    benzocaine-menthoL (CEPACOL) lozenge 1 Lozenge  1 Lozenge Mucous Membrane PRN    dexamethasone (DECADRON) 4 mg/mL injection 4 mg  4 mg IntraMUSCular Q8H    amitriptyline (ELAVIL) tablet 25 mg  25 mg Oral QHS    pantoprazole (PROTONIX) tablet 40 mg  40 mg Oral BID    sucralfate (CARAFATE) tablet 1 g  1 g Oral AC&HS    sodium chloride (NS) flush 5-40 mL  5-40 mL IntraVENous Q8H    sodium chloride (NS) flush 5-40 mL  5-40 mL IntraVENous PRN    acetaminophen (TYLENOL) tablet 650 mg  650 mg Oral Q6H PRN    Or    acetaminophen (TYLENOL) suppository 650 mg  650 mg Rectal Q6H PRN    [Held by provider] ketorolac (TORADOL) injection 15 mg  15 mg IntraVENous Q6H PRN    morphine injection 2 mg  2 mg IntraVENous Q4H PRN    oxyCODONE IR (ROXICODONE) tablet 10 mg  10 mg Oral Q4H PRN    cyclobenzaprine (FLEXERIL) tablet 10 mg  10 mg Oral TID PRN       Social History:  Social History     Tobacco Use    Smoking status: Never Smoker    Smokeless tobacco: Never Used   Substance Use Topics    Alcohol use: Not Currently       Family History:  Family History   Problem Relation Age of Onset    Other Mother         Plantar Fasciitis    Cancer Father         prostate    Depression Father        Review of Systems:  Constitutional: negative fever, negative chills, negative weight loss  Eyes:   negative visual changes  ENT:   negative sore throat, tongue or lip swelling  Respiratory:  negative cough, negative dyspnea  Cards:  negative for chest pain, palpitations, lower extremity edema  GI:   See HPI  :  negative for frequency, dysuria  Integument:  negative for rash and pruritus  Heme:  negative for easy bruising and gum/nose bleeding  Musculoskeletal:negative for myalgias, back pain and muscle weakness  Neuro:    negative for headaches, dizziness, vertigo  Psych:  negative for feelings of anxiety, depression     Objective:     Patient Vitals for the past 8 hrs:   BP Temp Pulse Resp SpO2   12/21/20 0840 118/81 98.2 °F (36.8 °C) 82 16 96 %   12/21/20 0302 109/70 97.8 °F (36.6 °C) 69 15 96 %     No intake/output data recorded. 12/19 1901 - 12/21 0700  In: 320 [P.O.:320]  Out: -     EXAM:     CONST:  Pleasant lady lying in bed, no acute distress   NEURO:  alert and oriented x 3   HEENT: EOMI, no scleral icterus   LUNGS: clear to ausculation, (-) wheeze   CARD:   S1 S2   ABD:  soft, no tenderness, no rebound, bowel sounds (+) all 4 quadrants, no masses, non distended   EXT:  no edema, warm   PSYCH: full, not anxious     Data Review     Recent Labs     12/20/20  0233 12/18/20  1715   WBC 19.1* 11.8*   HGB 12.6 13.6   HCT 38.1 41.0    417*     Recent Labs     12/19/20  0428 12/18/20  1715   * 134*   K 4.4 4.2    105   CO2 22 24   BUN 12 13   CREA 0.60 0.89   * 169*   CA 9.1 8.6     No results for input(s): AP, TBIL, TP, ALB, GLOB, GGT, AML, LPSE in the last 72 hours.     No lab exists for component: SGOT, GPT, AMYP, HLPSE  Recent Labs     12/19/20  0428   INR 1.0   PTP 10.3          Assessment:     · GERD: EGD 12/9/2020: 5cm hiatal hernia, grade 3 erosive esophagitis, diffuse changes (furrows and white specks) suggestive of EoE; biopsy 28 eosinophils per HPF  · C5-C6 disk herniation with cervical radicular pain status post diskectomy and fusion   · Chest pain/sore throat     Patient Active Problem List   Diagnosis Code    [facility], [facility], [facility], [facility] ([facility] F33.0    Pain R52     Plan:       · BID PPI  · Pepcid QHS  · Carafate  · Outpatient follow-up with allergist   · Patient discussed with and will be seen by Dr. Maya Bolanos  · Thank you for allowing me to participate in care of Vasyl Edge     Signed By: Iris Hollis NP     12/21/2020  10:45 AM       Agree with above  Not much to add to her current management  F/u with me as outpatient  Will follow as needed

## 2020-12-21 NOTE — PROGRESS NOTES
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 HOME SUPPORT PRIOR TO ADMISSION: The patient lived with mother, brother , children  but did not require assist.    Physical Therapy Goals  Initiated 12/20/2020    1. Patient will move from supine to sit and sit to supine , scoot up and down, and roll side to side in bed with modified independence (log roll) within 4 days. 2. Patient will perform sit to stand with modified independence within 4 days. 3. Patient will ambulate with modified independence for > 200 feet with the least restrictive device within 4 days. 4. Patient will ascend/descend 4 stairs with 1 handrail(s) with supervision/set-up within 4 days. 5. Patient will verbalize and demonstrate understanding of spinal precautions (No bending, lifting greater than 5 lbs, or twisting; log-roll technique; frequent repositioning as instructed. Soft cervical collar when OOB) within 4 days. Outcome: Progressing Towards Goal  PHYSICAL THERAPY MORNING SESSION NOTE  Patient: Germán Laura (30 y.o. female)  Date: 12/21/2020  Primary Diagnosis: Pain [R52]  Procedure(s) (LRB):  SPINE ANTERIOR CERVICAL FUSION WITH INSTRUMENTATION C5-C6, SYNTHES ZERO POINT INSTRUMENTATION, MICROSCOPE (N/A) 2 Days Post-Op   Precautions:  Spinal, Sylvia Samira Hernandez was seen for morning PT session. She is walking 50 feet at SBA/Supervision without any assistive device. The primary limiting factors are pain management as pt continues to report 8/10 pain and pt's c/o difficulty swallowing whole pills and food (reports \"it's like I start gasping for air as soon as I swallow\"). She reports that GI to see pt as she did just recently have a GI procedure done about 9 days prior to cervical surgery.  Currently, Dinah Dueñas remains cleared from mobility standpoint once medically cleared as she continues to demonstrate Mod I to SB/Supervision for all functional transfers and mobility. Will have tech follow up at later time as able and appropriate. Morning session functional mobility:  Bed Mobility:  Rolling: Modified independent  Supine to Sit: Modified independent  Sit to Supine: Modified independent     Transfers:  Sit to Stand: Stand-by assistance;Assist x1  Stand to Sit: Stand-by assistance;Assist x1                       Balance:   Sitting: Intact  Standing: Intact; With support  Ambulation/Gait Training:  Distance (ft): 100 Feet (ft)  Assistive Device: Gait belt  Ambulation - Level of Assistance: Supervision;Stand-by assistance        Gait Abnormalities: Decreased step clearance              Speed/Crystal: Pace decreased (<100 feet/min)  Step Length: Left shortened;Right shortened                 Stairs:  Number of Stairs Trained: 8(4 steps x2)  Stairs - Level of Assistance: Stand-by assistance;Assist X1  Rail Use: Left (light HHA opposite hand)    Thank you,  Annie Interiano,PTA   Time Calculation: 9 mins

## 2020-12-21 NOTE — PROGRESS NOTES
Hospitalist Progress Note  Maria D Alvarez MD  Answering service: [phone] from in house phone      Date of Service:  2020  NAME:  Vasyl Edge  :  1985  MRN:  [id number]      Admission Summary:   Vasyl Edge is a 30 y.o. female with a past medical history of C5-C6 cervical fusion in [place]. In 2015, anxiety, asthma, and eosinophilic esophagitis who presents with left-sided neck pain with radiation down her left arm. Evaluated in the ED, and reportedly underwent imaging that was normal.  She was treated with Toradol, Valium, and steroids with no relief. He denies any acute injuries, or change in physical activity. She denies upper or lower extremity paresthesias, or weakness.     In the ED, shoulder x-ray was normal.  Cervical spine MRI showed mild canal, and severe left foraminal stenosis at C5-C6. Dr. Bianca Carrasquillo from neurosurgery was consulted, and recommended steroids with pain control, and will see patient as a consultation. Interval history / Subjective:      Patient seen and examined. Late entry of the note as patient was first discharged but then kept due pain. Patient stated her pain is in the mid chest as well as sore throat. Assessment & Plan:     # Left C5-C6 severe left subarticular stenosis with cervical radicular pain  hx C5-C6 fusion in  in [place], now with failed outpatient treatment.   - CT Left lateral and subarticular disc herniation at C5-6 with severe left  subarticular stenosis.   - C-spine MRI with mild canal, and severe left foraminal stenosis  - s/p C5-6 anterior cervical diskectomy w/ instrumental fusion   - on IV steroid, IV pain medication   - Neurosurgery followed patient plan to discharge home    - PT/OT     # Chest pain/sore throat   Looks like musculoskeletal     # Hx of GERD   - on PPI      Code: Full   MPOA: Clarke Rush [phone]  dvt ppx: SCD     FUNCTIONAL STATUS PRIOR TO HOSPITALIZATION Ambulates Independently      Hospital Problems  Date Reviewed: 12/9/2020          Codes Class Noted POA    Pain ICD-10-CM: R52  ICD-9-CM: 780.96  12/18/2020 Unknown                Review of Systems:   Pertinent positive mentioned in interval hx/HPI. Other systems reviewed and negative     Vital Signs:    Last 24hrs VS reviewed since prior progress note. Most recent are:  Visit Vitals  /81 (BP 1 Location: Right arm, BP Patient Position: At rest)   Pulse 82   Temp 98.2 °F (36.8 °C)   Resp 16   Ht 5' 2\" (1.575 m)   Wt 80.7 kg (178 lb)   SpO2 96%   BMI 32.56 kg/m²         Intake/Output Summary (Last 24 hours) at 12/21/2020 5025  Last data filed at 12/20/2020 1210  Gross per 24 hour   Intake 320 ml   Output    Net 320 ml        Physical Examination:             Constitutional:  No acute distress, On neck collar    ENT:  Oral mucous moist, oropharynx benign. Neck supple,    Resp:  Pain to chest pressure, CTA bilaterally. No wheezing/rhonchi/rales. No accessory muscle use   CV:  Regular rhythm, normal rate, no murmurs, gallops, rubs    GI:  Soft, non distended, non tender. normoactive bowel sounds, no hepatosplenomegaly     Musculoskeletal:  No edema, warm, 2+ pulses throughout    Neurologic:  Cervical vertebral tenderness with radicular pain, limited active and passive motion of LUE pain which seems better than yesterday, Otherwise Moves all extremities. AAOx3, CN II-XII reviewed           Data Review:   I personally reviewed labs and imaging     Labs:     Recent Labs     12/20/20  0233 12/18/20  1715   WBC 19.1* 11.8*   HGB 12.6 13.6   HCT 38.1 41.0    417*     Recent Labs     12/19/20  0428 12/18/20  1715   * 134*   K 4.4 4.2    105   CO2 22 24   BUN 12 13   CREA 0.60 0.89   * 169*   CA 9.1 8.6     No results for input(s): ALT, AP, TBIL, TBILI, TP, ALB, GLOB, GGT, AML, LPSE in the last 72 hours.     No lab exists for component: SGOT, GPT, AMYP, HLPSE  Recent Labs 12/19/20  0428   INR 1.0   PTP 10.3      No results for input(s): FE, TIBC, PSAT, FERR in the last 72 hours. No results found for: FOL, RBCF   No results for input(s): PH, PCO2, PO2 in the last 72 hours. No results for input(s): CPK, CKNDX, TROIQ in the last 72 hours.     No lab exists for component: CPKMB  No results found for: CHOL, CHOLX, CHLST, CHOLV, HDL, HDLP, LDL, LDLC, DLDLP, TGLX, TRIGL, TRIGP, CHHD, CHHDX  No results found for: GLUCPOC  No results found for: COLOR, APPRN, SPGRU, REFSG, MARTIN, PROTU, GLUCU, KETU, BILU, UROU, FRANCISCO, LEUKU, GLUKE, EPSU, BACTU, WBCU, RBCU, CASTS, UCRY      Medications Reviewed:     Current Facility-Administered Medications   Medication Dose Route Frequency    benzocaine-menthoL (CEPACOL) lozenge 1 Lozenge  1 Lozenge Mucous Membrane PRN    dexamethasone (DECADRON) 4 mg/mL injection 4 mg  4 mg IntraMUSCular Q8H    amitriptyline (ELAVIL) tablet 25 mg  25 mg Oral QHS    pantoprazole (PROTONIX) tablet 40 mg  40 mg Oral BID    diphenhydrAMINE (BENADRYL) injection 25 mg  25 mg IntraVENous Q4H PRN    sucralfate (CARAFATE) tablet 1 g  1 g Oral AC&HS    sodium chloride (NS) flush 5-40 mL  5-40 mL IntraVENous Q8H    sodium chloride (NS) flush 5-40 mL  5-40 mL IntraVENous PRN    acetaminophen (TYLENOL) tablet 650 mg  650 mg Oral Q6H PRN    Or    acetaminophen (TYLENOL) suppository 650 mg  650 mg Rectal Q6H PRN    [Held by provider] ketorolac (TORADOL) injection 15 mg  15 mg IntraVENous Q6H PRN    morphine injection 2 mg  2 mg IntraVENous Q4H PRN    oxyCODONE IR (ROXICODONE) tablet 10 mg  10 mg Oral Q4H PRN    cyclobenzaprine (FLEXERIL) tablet 10 mg  10 mg Oral TID PRN     ______________________________________________________________________  EXPECTED LENGTH OF STAY: - - -  ACTUAL LENGTH OF STAY:          0                 Brianna Hdez MD   Patient has given Verbal permission to discuss medical care with   persons present in the room and and also with contact as listed on face sheet. Please note that this dictation was completed with [employer], the computer voice recognition software. Quite often unanticipated grammatical, syntax, homophones, and other interpretive errors are inadvertently transcribed by the computer software. Please disregard these errors. Please excuse any errors that have escaped final proofreading. Thank you.

## 2020-12-21 NOTE — PROGRESS NOTES
2045 Patient does not want to be discharged tonight. Patient states that it is too late for her to  meds from the pharmacy and is still complaining of pain when swallowing. Paged NP to discuss plans. 2050 NPJoel stated okay to hold onto the patient for the night and to continue with the PO pain medicine. 0800 Bedside and Verbal shift change report given to Cameron Velazquez (oncoming nurse) by Melvi Thomas RN (offgoing nurse). Report included the following information SBAR, Kardex, Procedure Summary, Intake/Output, MAR, Accordion and Recent Results.

## 2020-12-21 NOTE — PROGRESS NOTES
Patient experiencing esophageal irritation and requests a consult with her GI doctor here at [facility] before discharge. Dr. Luis Alfredo Gomez notified, GI consult called.

## 2020-12-21 NOTE — DISCHARGE SUMMARY
Discharge Summary       PATIENT ID: Rita Santos  MRN: [id number]   YOB: 1985    DATE OF ADMISSION: 12/17/2020  9:02 PM    DATE OF DISCHARGE: 12/21/20  PRIMARY CARE PROVIDER: Nidia Daniel MD       DISCHARGING PROVIDER: Husam Bain MD    To contact this individual call [phone] and ask the  to page. If unavailable ask to be transferred the Adult Hospitalist Department. CONSULTATIONS: IP CONSULT TO NEUROSURGERY  IP CONSULT TO HOSPITALIST  IP CONSULT TO GASTROENTEROLOGY      PROCEDURES/SURGERIES: Procedure(s):  SPINE ANTERIOR CERVICAL FUSION WITH INSTRUMENTATION C5-C6, SYNTHES ZERO POINT INSTRUMENTATION, MICROSCOPE    IMAGING  Xr Neck Soft Tissue    Result Date: 12/21/2020  IMPRESSION: No acute abnormality. Xr Chest Sngl V    Result Date: 12/21/2020  IMPRESSION: No acute process. Xr Shoulder Lt Ap/lat Min 2 V    Result Date: 12/17/2020  IMPRESSION: No acute abnormality. Mri Cerv Spine Wo Cont    Result Date: 12/17/2020  IMPRESSION: Disc degenerative changes most dense at C5-C6. There is mild canal and severe left foraminal stenosis at C5-6 with a moderate left lateral recess and left foraminal protrusion/osteophyte at C5-6. Ct Spine Cerv Wo Cont    Result Date: 12/18/2020  IMPRESSION: 1. C6-7 anterior cervical fixation with interbody graft placement. Solid osseous fusion of the centrally positioned graft is demonstrated. No disruption of the fixation device is shown. 2. Left lateral and subarticular disc herniation at C5-6 with severe left subarticular stenosis. [id number] COURSE:   # Left C5-C6 severe left subarticular stenosis with cervical radicular pain  hx C5-C6 fusion in 2015 in [place], now with failed outpatient treatment.   - CT Left lateral and subarticular disc herniation at C5-6 with severe left subarticular stenosis.   - C-spine MRI with mild canal, and severe left foraminal stenosis  - s/p C5-6 anterior cervical diskectomy w/ instrumental fusion   - on IV steroid, IV pain medication   - Neurosurgery followed, appt input    - PT/OT      # Chest pain/sore throat   - looks like musculoskeletal      # Hx of GERD   - on PPI   - Evaluated by GI per patient request. Additional Pepcid qhs and carafarte added            DISCHARGE DIAGNOSES / PLAN:      # Left C5-C6 severe left subarticular cervical stenosis with cervical radicular pain  D/floyd home     Patient Active Problem List   Diagnosis Code    Depression, major, recurrent, mild ([facility] [address] F33.0    Pain R52               PENDING TEST RESULTS:   At the time of discharge the following test results are still pending: None     FOLLOW UP APPOINTMENTS:    Follow-up Information     Follow up With Specialties Details Why Contact Info    Rhys Restrepo MD Neurosurgery In 2 weeks  [facility] [phone] [facility]      Kierra Andersen MD Family Medicine Schedule an appointment as soon as possible for a visit in 2 weeks Post hospital discharge follow up  [address]  [phone]             ADDITIONAL CARE RECOMMENDATIONS:   · It is important that you take the medication exactly as they are prescribed. · Keep your medication in the bottles provided by the pharmacist and keep a list of the medication names, dosages, and times to be taken in your wallet. · Do not take other medications without consulting your doctor. · No drinking alcohol or driving car or operating machinery if you are on narcotic pain medications. Donot take sedating mediations if you are sleepy or confused.    · Fall Precautions  · Keep Well Hydrated  · Report to your medical provider if you feel you have  developed allergies to medications  · Follow up with your PCP or Consultant for medication adjustments and refills  · Monitor for signs of fevers,chills,bleeding,chest pain and seek medical attention if you do so. - Please use the technique shown to you by physical therapy. DIET: Regular diet     ACTIVITY: As tolerated     WOUND CARE: None     EQUIPMENT needed: None       DISCHARGE MEDICATIONS:  Current Discharge Medication List      START taking these medications    Details   sucralfate (Carafate) 1 gram tablet Take 1 Tab by mouth four (4) times daily for 20 days. Qty: 80 Tab, Refills: 0      famotidine (Pepcid) 20 mg tablet Take 1 Tab by mouth nightly for 30 days. Qty: 30 Tab, Refills: 0      cyclobenzaprine (FLEXERIL) 10 mg tablet Take 1 Tab by mouth three (3) times daily as needed for Muscle Spasm(s) for up to 5 days. Qty: 15 Tab, Refills: 0      oxyCODONE IR (ROXICODONE) 10 mg tab immediate release tablet Take 1 Tab by mouth every six (6) hours as needed for Pain for up to 5 days. Max Daily Amount: 40 mg.  Qty: 20 Tab, Refills: 0    Associated Diagnoses: Spinal stenosis of cervical region; Intractable cervical neuropathic pain      predniSONE (DELTASONE) 20 mg tablet Take 40 mg by mouth daily (with breakfast) for 5 days. Qty: 10 Tab, Refills: 0         CONTINUE these medications which have NOT CHANGED    Details   amitriptyline (ELAVIL) 25 mg tablet Take 25 mg by mouth nightly as needed for Sleep.      pantoprazole (Protonix) 40 mg tablet Take 1 Tab by mouth two (2) times a day. Qty: 180 Tab, Refills: 1    Associated Diagnoses: EE (eosinophilic esophagitis)         STOP taking these medications       methocarbamoL (Robaxin) 500 mg tablet Comments:   Reason for Stopping:               All Micro Results     None          No results found for this or any previous visit (from the past 24 hour(s)). NOTIFY YOUR PHYSICIAN FOR ANY OF THE FOLLOWING:   Fever over 101 degrees for 24 hours. Chest pain, shortness of breath, fever, chills, nausea, vomiting, diarrhea, change in mentation, falling, weakness, bleeding. Severe pain or pain not relieved by medications.   Or, any other signs or symptoms that you may have questions about. DISPOSITION:  X  Home With:   OT  PT  HH  RN       Long term SNF/Inpatient Rehab    Independent/assisted living    Hospice    Other:       PATIENT CONDITION AT DISCHARGE:     Functional status    Poor     Deconditioned    X Independent      Cognition     Lucid     Forgetful     Dementia      Catheters/lines (plus indication)    [place]     PICC     PEG    X None      Code status     Full code    X DNR      PHYSICAL EXAMINATION AT DISCHARGE:  Gen:  No distress, alert  HEENT:  Normal cephalic atraumatic, extra-occular movements are intact. Neck:  Supple, No JVD  Lungs:  Clear bilaterally, no wheeze, no rales, normal effort  Heart:  Regular Rate and Rhythm, normal S1 and S2, no edema  Abdomen:  Soft, non tender, normal bowel sounds, no guarding. Extremities:  Well perfused, no cyanosis or edema  Neurological:  Awake and alert, CN's are intact, normal strength throughout extremities  Skin:  No rashes or moles  Mental Status:  Normal thought process, does not appear anxious      CHRONIC MEDICAL DIAGNOSES:  Problem List as of 12/21/2020 Date Reviewed: 12/9/2020          Codes Class Noted - Resolved    Pain ICD-10-CM: R52  ICD-9-CM: 780.96  12/18/2020 - Present        Dep[facility] major[facility] recurrent[facility] ([facility] ICD-10-CM: F33.0  ICD-9-CM: 296.31  10/21/2020 - Present                Discussed with patient and family. Explained the importance of following up, Compliance with medications and recommendations on discharge,Side effect profile of medications were explained. Safety precautions at home and while taking pain medications also explained. All questions answered to the satisfaction of the patient/family. Discussed with consultant(s) who are agreeable to the discharge. Verbal and written instructions on discharge given. Explained about Discharge medications and side effect profile.   Advised patient/family to followup with their pcp for medication refills and preauthorization of medications, Home health orders. checkups,screenign programs as appropriate for age. Thank you Cat Hood MD for taking care of your patient, Please call with any questions. Greater than 35 minutes were spent with the patient on counseling and coordination of care    Signed:   Ritika Preciado MD  12/21/2020  4:46 PM    Please note that this dictation was completed with [employer], the computer voice recognition software. Quite often unanticipated grammatical, syntax, homophones, and other interpretive errors are inadvertently transcribed by the computer software. Please disregard these errors. Please excuse any errors that have escaped final proofreading. Thank you.

## 2020-12-21 NOTE — PROGRESS NOTES
Per Dr. Marino Montez, ok for patient to go down to radiology with transport. RN does not need to accompany patient.

## 2020-12-28 NOTE — DISCHARGE INSTRUCTIONS
After Hospital Care Plan:  Discharge Instructions Cervical (Neck) Spine Surgery Dr. Vince Sampson    Patient Name: Davie Donnelly    Date of procedure: 12/19/2020  Date of discharge: 12/21/2020    Procedure: Procedure(s):  SPINE ANTERIOR CERVICAL FUSION WITH INSTRUMENTATION C5-C6, SYNTHES [address], MICROSCOPE  PCP: Dwain Toscano MD      Follow up appointments  -follow up with Dr. Vince Sampson in 2 weeks. [phone] to make an appointment as soon as you get home from the hospital.    When to call your Spine Surgeon:  -Difficulty swallowing that is worse than when you left the hospital.  -Signs of infection-if your incision is red; continues to have drainage; drainage has a foul odor or if you have a persistent fever over 101 degrees for 24 hours  -nausea or vomiting, severe headache  -changes in sensation in your arms or legs (numbness, tingling, loss of color)  -increased weakness-greater than before your surgery  -severe pain or pain not relieved by medications  -Signs of a blood clot in your leg-calf pain, tenderness, redness, swelling of lower leg    When to call your Primary Care Physician:  -Concerns about medical conditions such as diabetes, high blood pressure, asthma, congestive heart failure  -Call if blood sugars are elevated, persistent headache or dizziness, coughing or congestion, constipation or diarrhea, burning with urination, abnormal heart rate    When to call 911 and go to the nearest emergency room:  -acute onset of chest pain, shortness of breath, difficulty breathing    Activity  - You are going home a well person, be as active as possible. Your only exercise should be walking. Start with short frequent walks and increase your walking distance each day.  -Limit the amount of time you sit to 20-30 minute intervals. Sitting for prolonged periods of time will be uncomfortable for you following surgery.   - Do NOT lift anything over 5 pounds  -From now on, even when lifting light weight, bend with your knees and not your back.  -Do NOT do any neck exercises until you have been instructed by your doctor  -When you are in bed, you may lay on your back or on either side. Do NOT lie on your stomach    Cervical Collar (Aspen Collar)  -You are required to wear your cervical collar at all times; except when showering.    -Do not bend or twist when your collar is off. It is best to have someone assist you when changing the pads and your dressing to prevent you from bending your neck. - Clean the pads on your neck collar every day by hand washing with a mild soap and water. Pat them dry with a towel and lay out to air dry. Do not use heat to dry the pads. Diet  -resume usual diet; drink plenty of fluids; eat foods high in fiber  -It is important to have regular bowel movements. Pain medications may cause constipation. You may want to take a stool softener (such as Senokot-S or Colace) to prevent constipation.  -If constipation occurs, take a laxative (such as Dulcolax tablets, Milk of Magnesia, or a suppository). Laxatives should only be used if the above preventable measures have failed and you still have not had a bowel movement after three days    Driving  -You may not drive or return to work until instructed by your physician. However, you may ride in the car for short periods of time. Incision Care  -You may take brief showers but do not run the water run directly onto the wound. After showering or bathing gently blot the wound dry with a soft towel.  -Do not rub or apply any lotions or ointments to your incision site.   -Do not soak or scrub your wound; do not swim until the adhesive film has fallen off naturally  -Do not scratch, rub, or pick at the wound or skin glue, doing so may loosen the film before the wound is fully healed  -Stay out of direct sunlight and do not use tanning lamps     Showering  -You may shower in approximately 4 days after your surgery. -Do not take a tub bath. Preventing blood clots  -You have been given T.E.D. stockings to wear. Continue to wear these for 7 days after your discharge. Put them on in the morning and take them off at night.    -They are used to increase your circulation and prevent blood clots from forming in your legs  -T. E.D. stockings can be machine washed, temperature not to exceed 160° F (71°C) and machine dried for 15 to 20 minutes, temperature not to exceed 250° F (121°C). Pain management  -Take pain medication as prescribed; decrease the amount you use as your pain lessens.  -Do not wait until you are in extreme pain to take your medication.  -Avoid alcoholic beverages while taking pain medication. Pain Medication Safety  DO:  -Read the Medication Guide   -Take your medicine exactly as prescribed   -Store your medicine away from children and in a safe place   -Flush unused medicine down the toilet   -Call your healthcare provider for medical advice about side effects. You may report side effects to FDA at 6-152-FDA-2718.   -Please be aware that many medications contain Tylenol. We do not want you to over medicate so please read the information below as a guide. Do not take more than 4 Grams of Tylenol in a 24 hour period. (There are 1000 milligrams in one Gram)                                                                                                                                                                                                                                                                                                                                                                  Percocet contains 325 mg of Tylenol per tablet (do not take more than 12 tablets in 24 hours)  Lortab contains 500 mg of Tylenol per tablet (do not take more than 8 tablets in 24 hours)  Norco contains 325 mg of Tylenol per tablet (do not take more than 12 tablets in 24 hours).   DO NOT:  -Do not give your medicine to others   -Do not take medicine unless it was prescribed for you   -Do not stop taking your medicine without talking to your healthcare provider   -Do not break, chew, crush, dissolve, or inject your medicine. If you cannot  swallow your medicine whole, talk to your healthcare provider.  -Do not drink alcohol while taking this medicine  -Do not take anti-inflammatory medications or aspirin unless instructed by your   physician. Cervical Spinal Fusion: What to Expect at Home  Your Recovery     Cervical spinal fusion is surgery that joins two or more of the vertebrae in your neck. It made your neck more stable. After surgery, you can expect your neck to feel stiff and sore. This should improve in the weeks after surgery. You may have trouble sitting or standing in one position for very long and may need pain medicine in the weeks after your surgery. You may need to wear a neck brace for a while. It may take 4 to 6 weeks to get back to your usual activities. How long it takes depends on what kind of surgery you had. Your doctor may advise you to work with a physical therapist to strengthen the muscles around your neck and back. This care sheet gives you a general idea about how long it will take for you to recover. But each person recovers at a different pace. Follow the steps below to get better as quickly as possible. How can you care for yourself at home? Activity    · Rest when you feel tired. Getting enough sleep will help you recover.     · Try to walk each day. Start by walking a little more than you did the day before. Bit by bit, increase the amount you walk. Walking boosts blood flow and helps prevent pneumonia and constipation. Walking may also decrease your muscle soreness after surgery.     · Follow your doctor's directions about not lifting anything that would strain your neck and back.  This may include a child, heavy grocery bags and milk containers, a heavy briefcase or backpack, cat litter or dog food bags, or a vacuum .     · Avoid strenuous activities, such as bicycle riding, jogging, weightlifting, or aerobic exercise, until your doctor says it is okay.     · Do not drive for 2 to 4 weeks after your surgery or until your doctor says it is okay.     · Avoid taking long car trips for 2 to 4 weeks after surgery. Your neck may become tired and painful from sitting too long in one position.     · You will probably need to take 4 to 6 weeks off from work. It depends on the type of work you do and how you feel.     · You may have sex as soon as you feel able, but avoid positions that put stress on your neck or cause pain. Diet    · You can eat your normal diet. If your stomach is upset, try bland, low-fat foods like plain rice, broiled chicken, toast, and yogurt.     · Drink plenty of fluids. If you have kidney, heart, or liver disease and have to limit fluids, talk with your doctor before you increase the amount of fluids you drink.     · You may notice that your bowel movements are not regular right after your surgery. This is common. Try to avoid constipation and straining with bowel movements. You may want to take a fiber supplement every day. If you have not had a bowel movement after a couple of days, ask your doctor about taking a mild laxative. Medicines    · Your doctor will tell you if and when you can restart your medicines. He or she will also give you instructions about taking any new medicines.     · If you take aspirin or some other blood thinner, ask your doctor if and when to start taking it again. Make sure that you understand exactly what your doctor wants you to do.     · Be safe with medicines. Take pain medicines exactly as directed. ? If the doctor gave you a prescription medicine for pain, take it as prescribed.   ? If you are not taking a prescription pain medicine, ask your doctor if you can take an over-the-counter medicine.     · If your doctor prescribed antibiotics, take them as directed. Do not stop taking them just because you feel better. You need to take the full course of antibiotics.     · If you think your pain pill is making you sick to your stomach:  ? Take your pills after meals (unless your doctor has told you not to). ? Ask your doctor for a different pain pill. Incision care    · You will be given specific instructions about how to care for the cut (incision) the doctor made. The instructions will depend on the type of materials used to close the cut. Exercise    · Do exercises as instructed by your doctor or physical therapist to improve your strength and flexibility. Other instructions    · Follow your doctor's instructions about wearing a brace or collar to support your neck.     · To reduce stiffness and help sore muscles, use a warm water bottle, a heating pad set on low, or a warm cloth on your neck. Do not put heat right over the incision. Do not go to sleep with a heating pad on your skin. Follow-up care is a key part of your treatment and safety. Be sure to make and go to all appointments, and call your doctor if you are having problems. It's also a good idea to know your test results and keep a list of the medicines you take. When should you call for help? Call 911 anytime you think you may need emergency care. For example, call if:    · You passed out (lost consciousness).     · You have chest pain, are short of breath, or cough up blood.     · You are unable to move an arm or a leg at all. Call your doctor now or seek immediate medical care if:    · You have pain that does not get better after you take pain medicine.     · You have loose stitches, or your incision comes open.     · Bright red blood has soaked through the bandage over your incision.     · You have signs of a blood clot in your leg (called a deep vein thrombosis), such as:  ? Pain in your calf, back of the knee, thigh, or groin. ?  Redness or swelling in your leg.     · You have signs of infection, such as:  ? Increased pain, swelling, warmth, or redness. ? Red streaks leading from the incision. ? Pus draining from the incision. ? A fever.     · You have new or worse symptoms in your arms, legs, chest, belly, or buttocks. Symptoms may include:  ? Numbness or tingling. ? Weakness. ? Pain.     · You lose bladder or bowel control. Watch closely for any changes in your health, and be sure to contact your doctor if:    · You do not get better as expected. Where can you learn more? Go to http://www.gray.com/  Enter Y543 in the search box to learn more about \"Cervical Spinal Fusion: What to Expect at Home. \"  Current as of: March 2, 2020               Content Version: 12.6  © 5478-0726 [employer]. Care instructions adapted under license by [facility] (which disclaims liability or warranty for this information). If you have questions about a medical condition or this instruction, always ask your healthcare professional. [place] [address] any warranty or liability for your use of this information. Discharge Instructions       PATIENT ID: Stacy Cerda  MRN: [id number]   YOB: 1985    DATE OF ADMISSION: 12/17/2020  9:02 PM    DATE OF DISCHARGE: 12/20/2020    PRIMARY CARE PROVIDER: Estelle Ayon MD     ATTENDING PHYSICIAN: Angie Bazzi MD  DISCHARGING PROVIDER: Lucina Verduzco MD    To contact this individual call [phone] and ask the  to page. If unavailable ask to be transferred the Adult Hospitalist Department.     DISCHARGE DIAGNOSES   Left C5-C6 severe left subarticular cervical stenosis with cervical radicular pain    CONSULTATIONS: IP CONSULT TO NEUROSURGERY  IP CONSULT TO HOSPITALIST    PROCEDURES/SURGERIES: Procedure(s):  SPINE ANTERIOR CERVICAL FUSION WITH INSTRUMENTATION C5-C6, SYNTHES ZERO POINT INSTRUMENTATION, MICROSCOPE    PENDING TEST RESULTS:   At the time of discharge the following test results are still pending: None     FOLLOW UP APPOINTMENTS:   Follow-up Information     Follow up With Specialties Details Why Contact Info    Janie Phipps MD Neurosurgery In 2 weeks  [address]      Calderon Rodriguez MD Family Medicine Schedule an appointment as soon as possible for a visit in 2 weeks Post hospital discharge follow up  [address]  [phone]             ADDITIONAL CARE RECOMMENDATIONS:   - Please use the technique shown to you by physical therapy. DIET: Regular diet     ACTIVITY: As tolerated     WOUND CARE: None     EQUIPMENT needed: None       Radiology      DISCHARGE MEDICATIONS:   See Medication Reconciliation Form    · It is important that you take the medication exactly as they are prescribed. · Keep your medication in the bottles provided by the pharmacist and keep a list of the medication names, dosages, and times to be taken in your wallet. · Do not take other medications without consulting your doctor. NOTIFY YOUR PHYSICIAN FOR ANY OF THE FOLLOWING:   Fever over 101 degrees for 24 hours. Chest pain, shortness of breath, fever, chills, nausea, vomiting, diarrhea, change in mentation, falling, weakness, bleeding. Severe pain or pain not relieved by medications. Or, any other signs or symptoms that you may have questions about.       DISPOSITION:  x  Home With:   OT  PT  HH  RN       SNF/Inpatient Rehab/LTAC    Independent/assisted living    Hospice    Other:     CDMP Checked:   Yes ***     PROBLEM LIST Updated:  Yes ***       Signed:   Nataliya Kaye MD  12/20/2020  11:12 AM

## 2021-01-04 ENCOUNTER — OFFICE VISIT (OUTPATIENT)
Dept: FAMILY MEDICINE CLINIC | Age: 36
End: 2021-01-04
Payer: MEDICAID

## 2021-01-04 VITALS
DIASTOLIC BLOOD PRESSURE: 81 MMHG | TEMPERATURE: 97.7 F | SYSTOLIC BLOOD PRESSURE: 127 MMHG | OXYGEN SATURATION: 98 % | HEIGHT: 62 IN | BODY MASS INDEX: 33.58 KG/M2 | HEART RATE: 96 BPM | WEIGHT: 182.5 LBS

## 2021-01-04 DIAGNOSIS — F41.0 GENERALIZED ANXIETY DISORDER WITH PANIC ATTACKS: ICD-10-CM

## 2021-01-04 DIAGNOSIS — Z98.1 H/O SPINAL FUSION: ICD-10-CM

## 2021-01-04 DIAGNOSIS — F41.8 DEPRESSION WITH ANXIETY: ICD-10-CM

## 2021-01-04 DIAGNOSIS — M50.30 DDD (DEGENERATIVE DISC DISEASE), CERVICAL: Primary | ICD-10-CM

## 2021-01-04 DIAGNOSIS — M54.2 NECK PAIN: ICD-10-CM

## 2021-01-04 DIAGNOSIS — F41.1 GENERALIZED ANXIETY DISORDER WITH PANIC ATTACKS: ICD-10-CM

## 2021-01-04 DIAGNOSIS — Z72.820 POOR SLEEP: ICD-10-CM

## 2021-01-04 DIAGNOSIS — K20.0 EE (EOSINOPHILIC ESOPHAGITIS): ICD-10-CM

## 2021-01-04 PROCEDURE — 99214 OFFICE O/P EST MOD 30 MIN: CPT | Performed by: FAMILY MEDICINE

## 2021-01-04 RX ORDER — FAMOTIDINE 40 MG/1
TABLET, FILM COATED ORAL
COMMUNITY
Start: 2020-12-09

## 2021-01-04 RX ORDER — LIDOCAINE HYDROCHLORIDE 30 MG/G
CREAM TOPICAL 3 TIMES DAILY
Qty: 85 G | Refills: 0 | Status: SHIPPED | OUTPATIENT
Start: 2021-01-04

## 2021-01-04 NOTE — PROGRESS NOTES
Room: 3    Identified pt with two pt identifiers(name and ). Reviewed record in preparation for visit and have obtained necessary documentation. All patient medications has been reviewed. Chief Complaint   Patient presents with    Surgical Follow-up     fusion of C6 and C5       There are no preventive care reminders to display for this patient. Vitals:    21 1515   BP: 127/81   Pulse: 96   Temp: 97.7 °F (36.5 °C)   TempSrc: Oral   SpO2: 98%   Weight: 182 lb 8 oz (82.8 kg)   Height: 5' 2\" (1.575 m)   PainSc:   7       4. Have you been to the ER, urgent care clinic since your last visit? Hospitalized since your last visit? Yes [facility] because could not move arm on 2020; Tosavage Sorenson on 2020; Dec 17 at [facility]    5. Have you seen or consulted any other health care providers outside of the [address] [place] [address] since your last visit? Include any pap smears or colon screening. Dr Fatoumata Calderon     Patient is accompanied by self I have received verbal consent from Maebelle Aase to discuss any/all medical information while they are present in the room.

## 2021-01-04 NOTE — PROGRESS NOTES
[address] [address] Note      Subjective:     Chief Complaint   Patient presents with    Surgical Follow-up     fusion of C6 and C5     Kyle Noel is a 30y.o. year old female who presents for virtual evaluation of the following:    DDD, cervical  Chronic > 5 year  S/p Disc replacement and fusion, most recent 2020  Sx: intermittent neck pain radiates down back  Pain at night while sleeping  Characte: shapr in posterior neck  Tx:  Flexeril and oxycodone  - cannot tolerate po NSAID due to his tory of esophagitis  Pain improved since admission  Pain ratin/10  Unsure about plan for physical therpay  Plans to see surgeon in 2 weeks  Pus discharge form incision  Since 1 week ago. Managed by spinal specialist      Poor Sleep:   Chronic for more than 1 year  Reports. 10-15 days of no sleep in the past 1 month  Trigger: anxiety, pain and cannot get comfortable  Tx: amitriptyline 25mg with no effect. Reports compliance with this medicaiton  Limits caffeine intake to stop by 4pm., stops liquids 2 hours before bed     Depression with Anxiety:  Chronic. Diagnosed age 14  Current Mood/Symptoms: feeling more anxiety this year  Triggers:no known  Denies admission for mood or suicide attmept  Treatment:   Key Psychotherapeutic Meds             amitriptyline (ELAVIL) 25 mg tablet (Taking) Take 25 mg by mouth nightly as needed for Sleep.       Previous Tx: name unknwon  Therapist: None currently   Managed by PCP  MELISSA: 4>8>6  PHQ: 8>7>9  3 most recent PHQ Screens 2021   Little interest or pleasure in doing things Several days   Feeling down, depressed, irritable, or hopeless Several days   Total Score PHQ 2 2   Trouble falling or staying asleep, or sleeping too much Nearly every day   Feeling tired or having little energy Nearly every day   Poor appetite, weight loss, or overeating Several days   Feeling bad about yourself - or that you are a failure or have let yourself or your family down Not at all   Trouble concentrating on things such as school, work, reading, or watching TV Not at all   Moving or speaking so slowly that other people could have noticed; or the opposite being so fidgety that others notice Not at all   Thoughts of being better off dead, or hurting yourself in some way Not at all   PHQ 9 Score 9       Social:   Employment- works for [employer] at [employer] with 3 kids, sister, and brother in law  - youngest child is autistic    Patient Care Team:  Vera Hutchins MD as PCP - General (Family Medicine)  Vera Hutchins MD as PCP - [facility]   Dentist- None  Optho- none  GYN- [facility]  [place] - [place]  [facility] in [place] [id number]          Past Medical History:   Diagnosis Date    Anxiety     Asthma     no medication    EE (eosinophilic esophagitis)         Social History     Socioeconomic History    Marital status: SINGLE     Spouse name: Not on file    Number of children: Not on file    Years of education: Not on file    Highest education level: Not on file   Occupational History    Not on file   Social Needs    Financial resource strain: Not on file    Food insecurity     Worry: Not on file     Inability: Not on file    Transportation needs     Medical: Not on file     Non-medical: Not on file   Tobacco Use    Smoking status: Never Smoker    Smokeless tobacco: Never Used   Substance and Sexual Activity    Alcohol use: Not Currently    Drug use: Never    Sexual activity: Not Currently   Lifestyle    Physical activity     Days per week: Not on file     Minutes per session: Not on file    Stress: Not on file   Relationships    Social connections     Talks on phone: Not on file     Gets together: Not on file     Attends [employer]an service: Not on file     Active member of club or organization: Not on file     Attends meetings of clubs or organizations: Not on file     Relationship status: Not on file    Intimate partner violence     Fear of current or ex partner: Not on file     Emotionally abused: Not on file     Physically abused: Not on file     Forced sexual activity: Not on file   Other Topics Concern    Not on file   Social History Narrative    Not on file       Family History   Problem Relation Age of Onset    Other Mother         Plantar Fasciitis    Cancer Father         prostate    Depression Father        Current Outpatient Medications   Medication Sig    levonorgestreL (MIRENA) 20 mcg/24 hours (6 yrs) 52 mg IUD Mirena 20 mcg/24 hours (6 yrs) 52 mg intrauterine device   place 1 device by intrauterine route    famotidine (PEPCID) 40 mg tablet TAKE 1 TABLET BY MOUTH AT BEDTIME    sucralfate (Carafate) 1 gram tablet Take 1 Tab by mouth four (4) times daily for 20 days.  amitriptyline (ELAVIL) 25 mg tablet Take 25 mg by mouth nightly as needed for Sleep.  pantoprazole (Protonix) 40 mg tablet Take 1 Tab by mouth two (2) times a day.  famotidine (Pepcid) 20 mg tablet Take 1 Tab by mouth nightly for 30 days. No current facility-administered medications for this visit. Objective:     Visit Vitals  /81 (BP 1 Location: Left arm, BP Patient Position: Sitting)   Pulse 96   Temp 97.7 °F (36.5 °C) (Oral)   Ht 5' 2\" (1.575 m)   Wt 182 lb 8 oz (82.8 kg)   SpO2 98%   BMI 33.38 kg/m²     Physical Examination:  General: Alert, cooperative, no distress, appears stated age. Obese  Eyes: Conjunctivae clear. Pupils equally round and reactive to light, Extraocular muscles intact. Ears: Normal external ear canals both ears. Nose: Nares normal. Septum midline. Mucosa normal. No drainage or sinus tenderness. Mouth/Throat: Lips, mucosa, and tongue normal. No oropharyngeal erythema. No tonsillar enlargement or exudate. Neck: Neck ROM limited in all direction. No thyroid enlargement/tenderness/nodules  Respiratory: Breathing comfortably, in no acute respiratory distress. Clear to auscultation bilaterally. Normal inspiratory and expiratory ratio. Cardiovascular: Regular rate and rhythm, S1, S2 normal, no murmur, click, rub or gallop.   -Extremities no edema. Pulses 2+ and symmetric radial and dorsalis pedis   Abdomen: Soft, non-tender, not distended. Bowel sounds normal. No masses or organomegaly. MSK: Extremities normal appearing, atraumatic, no effusion. Gait steady and unassisted. Skin: Skin color, texture, turgor normal. No rashes or lesions on exposed skin. Lymph nodes: Cervical, supraclavicular nodes normal.  Neurologic: Cranial nerves II-XII intact. Strength 5/5 grossly. Sensation and reflexes normal throughout. Psychiatric: Affect mild anxious- difficulty relaxing muscle during foot examinaiton. Mood anxious. Thoughts logical. Speech volume and speed normal        Admission on 12/17/2020, Discharged on 12/21/2020   Component Date Value Ref Range Status    WBC 12/18/2020 11.8* 3.6 - 11.0 K/uL Final    RBC 12/18/2020 4.99  3.80 - 5.20 M/uL Final    HGB 12/18/2020 13.6  11.5 - 16.0 g/dL Final    HCT 12/18/2020 41.0  35.0 - 47.0 % Final    MCV 12/18/2020 82.2  80.0 - 99.0 FL Final    MCH 12/18/2020 27.3  26.0 - 34.0 PG Final    MCHC 12/18/2020 33.2  30.0 - 36.5 g/dL Final    RDW 12/18/2020 13.2  11.5 - 14.5 % Final    PLATELET 88/29/7233 561* 150 - 400 K/uL Final    MPV 12/18/2020 8.8* 8.9 - 12.9 FL Final    NRBC 12/18/2020 0.0  0  WBC Final    ABSOLUTE NRBC 12/18/2020 0.00  0.00 - 0.01 K/uL Final    NEUTROPHILS 12/18/2020 93* 32 - 75 % Final    LYMPHOCYTES 12/18/2020 7* 12 - 49 % Final    MONOCYTES 12/18/2020 0* 5 - 13 % Final    EOSINOPHILS 12/18/2020 0  0 - 7 % Final    BASOPHILS 12/18/2020 0  0 - 1 % Final    IMMATURE GRANULOCYTES 12/18/2020 0  0.0 - 0.5 % Final    ABS. NEUTROPHILS 12/18/2020 11.0* 1.8 - 8.0 K/UL Final    ABS. LYMPHOCYTES 12/18/2020 0.8  0.8 - 3.5 K/UL Final    ABS. MONOCYTES 12/18/2020 0.0  0.0 - 1.0 K/UL Final    ABS.  EOSINOPHILS 12/18/2020 0.0  0.0 - 0.4 K/UL Final    ABS. BASOPHILS 12/18/2020 0.0  0.0 - 0.1 K/UL Final    ABS. IMM. GRANS. 12/18/2020 0.0  0.00 - 0.04 K/UL Final    DF 12/18/2020 SMEAR SCANNED    Final    RBC COMMENTS 12/18/2020 NORMOCYTIC, NORMOCHROMIC    Final    Sodium 12/18/2020 134* 136 - 145 mmol/L Final    Potassium 12/18/2020 4.2  3.5 - 5.1 mmol/L Final    Chloride 12/18/2020 105  97 - 108 mmol/L Final    CO2 12/18/2020 24  21 - 32 mmol/L Final    Anion gap 12/18/2020 5  5 - 15 mmol/L Final    Glucose 12/18/2020 169* 65 - 100 mg/dL Final    BUN 12/18/2020 13  6 - 20 MG/DL Final    Creatinine 12/18/2020 0.89  0.55 - 1.02 MG/DL Final    BUN/Creatinine ratio 12/18/2020 15  12 - 20   Final    GFR est AA 12/18/2020 >60  >60 ml/min/1.73m2 Final    GFR est non-AA 12/18/2020 >60  >60 ml/min/1.73m2 Final    Estimated GFR is calculated using the IDMS-traceable Modification of Diet in Renal Disease (MDRD) Study equation, reported for both  Americans (GFRAA) and non- Americans (GFRNA), and normalized to 1.73m2 body surface area. The physician must decide which value applies to the patient.     Calcium 12/18/2020 8.6  8.5 - 10.1 MG/DL Final    Sodium 12/19/2020 134* 136 - 145 mmol/L Final    Potassium 12/19/2020 4.4  3.5 - 5.1 mmol/L Final    Chloride 12/19/2020 106  97 - 108 mmol/L Final    CO2 12/19/2020 22  21 - 32 mmol/L Final    Anion gap 12/19/2020 6  5 - 15 mmol/L Final    Glucose 12/19/2020 124* 65 - 100 mg/dL Final    BUN 12/19/2020 12  6 - 20 MG/DL Final    Creatinine 12/19/2020 0.60  0.55 - 1.02 MG/DL Final    BUN/Creatinine ratio 12/19/2020 20  12 - 20   Final    GFR est AA 12/19/2020 >60  >60 ml/min/1.73m2 Final    GFR est non-AA 12/19/2020 >60  >60 ml/min/1.73m2 Final    Calcium 12/19/2020 9.1  8.5 - 10.1 MG/DL Final    INR 12/19/2020 1.0  0.9 - 1.1   Final    A single therapeutic range for Vit K antagonists may not be optimal for all indications - see June, 2008 issue of Chest[employer] Evidence-Based Clinical Practice Guidelines, 8th Edition.  Prothrombin time 12/19/2020 10.3  9.0 - 11.1 sec Final    WBC 12/20/2020 19.1* 3.6 - 11.0 K/uL Final    RBC 12/20/2020 4.65  3.80 - 5.20 M/uL Final    HGB 12/20/2020 12.6  11.5 - 16.0 g/dL Final    HCT 12/20/2020 38.1  35.0 - 47.0 % Final    MCV 12/20/2020 81.9  80.0 - 99.0 FL Final    MCH 12/20/2020 27.1  26.0 - 34.0 PG Final    MCHC 12/20/2020 33.1  30.0 - 36.5 g/dL Final    RDW 12/20/2020 13.3  11.5 - 14.5 % Final    PLATELET 52/55/9733 670  150 - 400 K/uL Final    MPV 12/20/2020 8.6* 8.9 - 12.9 FL Final    NRBC 12/20/2020 0.0  0  WBC Final    ABSOLUTE NRBC 12/20/2020 0.00  0.00 - 0.01 K/uL Final   Admission on 12/09/2020, Discharged on 12/09/2020   Component Date Value Ref Range Status    Pregnancy test,urine (POC) 12/09/2020 Negative  NEG   Final   Hospital Outpatient Visit on 12/07/2020   Component Date Value Ref Range Status    SARS-CoV-2 12/07/2020 Not Detected  Not Detected   Final    Comment: (NOTE)  This nucleic acid amplification test was developed and its  performance characteristics determined by [facility]. Nucleic acid amplification tests include PCR and TMA. This test has  not been FDA cleared or approved. This test has been authorized by  FDA under an Emergency Use Authorization (EUA). This test is only  authorized for the duration of time the declaration that  circumstances exist justifying the authorization of the emergency use  of in vitro diagnostic tests for detection of SARS-CoV-2 virus and/or  diagnosis of COVID-19 infection under section 564(b)(1) of the Act,  21 U. S.C. 230JMI-3(R) (1), unless the authorization is terminated or  revoked sooner. When diagnostic testing is negative, the possibility of a false  negative result should be considered in the context of a patient's  recent exposures and the presence of clinical signs and symptoms  consistent with COVID-19.  An individual without symptoms of COVID-  19 and who is not shedding SARS-CoV-2 vi                           anitha would expect to have a  negative (not detected) result in this assay. Performed At: [address] [id number]  Liz Blackwell MD OR:[id number]           Assessment/ Plan:   Diagnoses and all orders for this visit:    1. DDD (degenerative disc disease), cervical    2. Neck pain  -     lidocaine HCL (XYLOCAINE) 3 % topical cream; Apply  to affected area three (3) times daily. Apply to posterior neck three times daily as needed. 3. H/O spinal fusion    4. Depression with anxiety    5. Generalized anxiety disorder with panic attacks    6. Poor sleep    7. EE (eosinophilic esophagitis)        For today's visit, I did the following:  · Reviewed PMH as listed in orders  Chronic cerical DDD improved after recent cervical fusion. Add diclofenac gel otc or lidocaine cream for pain. Will increase amitriptyline for pain and mood. Depression with anxiety mild uncontrolled with poor sleep related to mood and surgical recovery. Increase amitriptyline for sleep and mood and pain. Continue therapy. On the basis of positive PHQ-9 screening  (PHQ 9 Score: 9), patient instructed to schedule follow-up visit at this practice. Patient will follow-up in 3 months. Encouraged counseling for mood disorder-referred to behavioral health specialist.  Follow up with specialists per routine. We discussed the expected course, resolution and complications of the diagnosis(es) in detail. Medication risks, benefits, costs, interactions, and alternatives were discussed as indicated. I advised her to contact the office if her condition worsens, changes or fails to improve as anticipated. She expressed understanding with the diagnosis(es) and plan. Educated patient on red flag symptoms to warrant return to clinic or emergency room visit.     I have discussed the diagnosis with the patient and the intended plan as seen in the above orders. The patient has been offered or received an after-visit summary and questions were answered concerning future plans. I have discussed medication side effects and warnings with the patient as well. Follow-up and Dispositions    · Return if symptoms worsen or fail to improve.        Signed,    Rama Rebollar MD  1/4/2021

## 2021-01-08 ENCOUNTER — TELEPHONE (OUTPATIENT)
Dept: FAMILY MEDICINE CLINIC | Age: 36
End: 2021-01-08

## 2021-03-17 ENCOUNTER — OFFICE VISIT (OUTPATIENT)
Dept: FAMILY MEDICINE CLINIC | Age: 36
End: 2021-03-17
Payer: MEDICAID

## 2021-03-17 VITALS
SYSTOLIC BLOOD PRESSURE: 103 MMHG | BODY MASS INDEX: 35.04 KG/M2 | WEIGHT: 190.4 LBS | TEMPERATURE: 98.7 F | DIASTOLIC BLOOD PRESSURE: 70 MMHG | HEIGHT: 62 IN | OXYGEN SATURATION: 98 % | HEART RATE: 92 BPM | RESPIRATION RATE: 18 BRPM

## 2021-03-17 DIAGNOSIS — R63.5 WEIGHT GAIN: ICD-10-CM

## 2021-03-17 DIAGNOSIS — F33.0 DEPRESSION, MAJOR, RECURRENT, MILD (HCC): ICD-10-CM

## 2021-03-17 DIAGNOSIS — F41.8 DEPRESSION WITH ANXIETY: Primary | ICD-10-CM

## 2021-03-17 DIAGNOSIS — Z72.820 POOR SLEEP: ICD-10-CM

## 2021-03-17 DIAGNOSIS — M54.2 NECK PAIN: ICD-10-CM

## 2021-03-17 DIAGNOSIS — E55.9 VITAMIN D DEFICIENCY: ICD-10-CM

## 2021-03-17 DIAGNOSIS — M50.30 DDD (DEGENERATIVE DISC DISEASE), CERVICAL: ICD-10-CM

## 2021-03-17 DIAGNOSIS — G43.909 MIGRAINE WITHOUT STATUS MIGRAINOSUS, NOT INTRACTABLE, UNSPECIFIED MIGRAINE TYPE: ICD-10-CM

## 2021-03-17 DIAGNOSIS — Z11.59 NEED FOR HEPATITIS C SCREENING TEST: ICD-10-CM

## 2021-03-17 DIAGNOSIS — E66.09 OBESITY DUE TO EXCESS CALORIES WITHOUT SERIOUS COMORBIDITY, UNSPECIFIED CLASSIFICATION: ICD-10-CM

## 2021-03-17 PROCEDURE — 99214 OFFICE O/P EST MOD 30 MIN: CPT | Performed by: FAMILY MEDICINE

## 2021-03-17 RX ORDER — AMITRIPTYLINE HYDROCHLORIDE 50 MG/1
50 TABLET, FILM COATED ORAL
Qty: 90 TAB | Refills: 1 | Status: SHIPPED | OUTPATIENT
Start: 2021-03-17

## 2021-03-17 NOTE — PROGRESS NOTES
Chief Complaint   Patient presents with    Follow-up     3 month follow up        1. Have you been to the ER, urgent care clinic since your last visit? Hospitalized since your last visit? No    2. Have you seen or consulted any other health care providers outside of the [address] [place] [address] since your last visit? Include any pap smears or colon screening.  No    3 most recent PHQ Screens 1/5/2021   Little interest or pleasure in doing things Several days   Feeling down, depressed, irritable, or hopeless Several days   Total Score PHQ 2 2   Trouble falling or staying asleep, or sleeping too much Nearly every day   Feeling tired or having little energy Nearly every day   Poor appetite, weight loss, or overeating Several days   Feeling bad about yourself - or that you are a failure or have let yourself or your family down Not at all   Trouble concentrating on things such as school, work, reading, or watching TV Not at all   Moving or speaking so slowly that other people could have noticed; or the opposite being so fidgety that others notice Not at all   Thoughts of being better off dead, or hurting yourself in some way Not at all   PHQ 9 Score 9

## 2021-03-17 NOTE — PATIENT INSTRUCTIONS
Weight Loss Tips: 
Remember this is like a part time job so your motivation and commitment is key to your success. Mindset Weight loss like any other behavior change starts in your mind. Think hard about what your motivates you to lose weight then meditate on that. Remind yourself of your motivation 
with phone alarms, scheduled meditation time, vision board, journal- just to name a few ideas. Have realistic goals. We expect with diligent healthy diet and physical activity you can lose 5% of your body weight in 3 
months. Wt in lbs x 0.05 = #lbs you should lose in 3 months. Make food and activity changes with a goal of CONSISTENCY not perfection. Food Start eating differently. Most of your weight loss and gain is from what you eat. Use small plates only Drink 2 liters (1/2 gallon) of water every day HALF of every meal should be fruit or vegetables Try meal prepping on Sunday (or your day off) with new different vegetables. Consider meal prep service such as [employer], wepremeals. com Replace soda with diet soda or other zero sugar drinks (selter water just fine) Consider using the [employer] viktor for calorie counting. Goal 4866-2780 calories per day Activity Staying physically active will help you lose more weight and can help you get over the plateau when you weight just 
won't change any more with diet. Start exercise at least 5 days per week for 40 minutes. Consider [employer] training viktor for home exercises. You can start with walking. I suggest walking at a speed of at least 3.5-4.5mph to for the weight loss benefit. Increase your speed or distance every 2 weeks. Do some slow stretching daily of legs, arms and back. Consider adding weight training with light weights at home or at the gym. See a doctor or a physical training for 
instructions in order to avoid injuries from doing muscle training incorrectly.  
Free fitness program in [place]: [employer] org/program/fitness-warriors/ 
 
 
  
Learning About Eating More Fruits and Vegetables What are some quick tips for eating more fruits and vegetables? We're all encouraged to eat more fruits and vegetables. Yet it can seem like one more chore on the daily to-do list. But you can add color and crunch to your mealsand lots of nutritionwith these quick tips. · Brighten up your breakfast. 
? Add sliced fruit or frozen berries to your yogurt, pancakes, or cereal. 
? Blend fresh or frozen fruit, veggies, and yogurt with a little fruit juice, and you've got a tasty smoothie. ? Make your scrambled eggs a gourmet treat by adding onions, celery, and bell peppers. ? Bake up some bran muffins with grated carrots added into the mix. · Make a livelier lunch. ? Jazz up tuna or chicken salad with apple chunks, celery, or grapesor all of them! ? Add cucumbers, avocado slices, tomatoes, and lettuce to your sandwiches. ? Kick up the flavor of grilled cheese sandwiches with spinach and tomatoes. ? Puree some potatoes or squash to add to tomato soup. · Add delicious veggies to dinner. ? Give more color and taste to salads. Stir in red cabbage, carrots, and bell peppers. Top salads with dried cranberries or raisins. \"Frost\" your salad with orange sections or strawberries. ? Keep a bag or two of frozen vegetables ready to pull out of the freezer for a side dish. ? Spice up spaghetti and meatballs with mushrooms and bell peppers. ? Roast vegetables like cauliflower or squash in the oven with olive oil to bring out their flavor. ? Season your veggie dish with herbs like basil and kelsie and a splash of lemon juice and olive oil. ? If you've got a main dish in the oven, stick in a potato to round out your meal. 
· Grab some healthy snacks on the go. ? Scoop up an apple, banana, or plum for a quick snack. ? Cut up raw fruits and veggies to keep in your fridge.  Grapes, oranges, carrots, and celery are great choices. They'll be ready for a quick snack or an after-school treat. ? Dip raw vegetables in hummus or peanut butter. ? Keep dried fruit on hand for an easy \"take with you\" snack. · Make something sweetand healthy. ? Try baked apples or pears topped with cinnamon and honey for a delicious dessert. ? Make chocolate chip cookies even better with grated carrots added to the mix. Where can you learn more? Go to http://www.gray.com/ Enter F050 in the search box to learn more about \"Learning About Eating More Fruits and Vegetables. \" Current as of: August 22, 2019               Content Version: 12.6 © 5780-1220 [employer]. Care instructions adapted under license by [employer] (which disclaims liability or warranty for this information). If you have questions about a medical condition or this instruction, always ask your healthcare professional. [place] [address] any warranty or liability for your use of this information. Learning About Mindfulness for Stress What are mindfulness and stress? Stress is what you feel when you have to handle more than you are used to. A lot of things can cause stress. You may feel stress when you go on a job interview, take a test, or run a race. This kind of short-term stress is normal and even useful. It can help you if you need to work hard or react quickly. Stress also can last a long time. Long-term stress is caused by stressful situations or events. Examples of long-term stress include long-term health problems, ongoing problems at work, and conflicts in your family. Long-term stress can harm your health. Mindfulness is a focus only on things happening in the present moment. It's a process of purposefully paying attention to and being aware of your surroundings, your emotions, your thoughts, and how your body feels.  You are aware of these things, but you aren't judging these experiences as \"good\" or \"bad. \" Mindfulness can help you learn to calm your mind and body to help you cope with illness, pain, and stress. How does mindfulness help to relieve stress? Mindfulness can help quiet your mind and relax your body. Studies show that it can help some people sleep better, feel less anxious, and bring their blood pressure down. And it's been shown to help some people live and cope better with certain health problems like heart disease, depression, chronic pain, and cancer. How do you practice mindfulness? To be mindful is to pay attention, to be present, and to be accepting. · When you're mindful, you do just one thing and you pay close attention to that one thing. For example, you may sit quietly and notice your emotions or how your food tastes and smells. · When you're present, you focus on the things that are happening right now. You let go of your thoughts about the past and the future. When you dwell on the past or the future, you miss moments that can heal and strengthen you. You may miss moments like hearing a child laugh or seeing a friendly face when you think you're all alone. · When you're accepting, you don't  the present moment. Instead you accept your thoughts and feelings as they come. You can practice anytime, anywhere, and in any way you choose. You can practice in many ways. Here are a few ideas: · While doing your chores, like washing the dishes, let your mind focus on what's in your hand. What does the dish feel like? Is the water warm or cold? · Go outside and take a few deep breaths. What is the air like? Is it warm or cold? · When you can, take some time at the start of your day to sit alone and think. · Take a slow walk by yourself. Count your steps while you breathe in and out. · Try yoga breathing exercises, stretches, and poses to strengthen and relax your muscles. · At work, if you can, try to stop for a few moments each hour.  Note how your body feels. Let yourself regroup and let your mind settle before you return to what you were doing. · If you struggle with anxiety or \"worry thoughts,\" imagine your mind as a blue ramy and your worry thoughts as clouds. Now imagine those worry thoughts floating across your mind's ramy. Just let them pass by as you watch. Follow-up care is a key part of your treatment and safety. Be sure to make and go to all appointments, and call your doctor if you are having problems. It's also a good idea to know your test results and keep a list of the medicines you take. Where can you learn more? Go to http://www.salvador.com/ Enter E999 in the search box to learn more about \"Learning About Mindfulness for Stress. \" Current as of: December 16, 2019               Content Version: 12.6 © 0893-5320 [employer]. Care instructions adapted under license by [employer] (which disclaims liability or warranty for this information). If you have questions about a medical condition or this instruction, always ask your healthcare professional. [address] any warranty or liability for your use of this information.

## 2021-03-18 LAB
25(OH)D3+25(OH)D2 SERPL-MCNC: 11.8 NG/ML (ref 30–100)
HCV AB S/CO SERPL IA: <0.1 S/CO RATIO (ref 0–0.9)
TSH SERPL DL<=0.005 MIU/L-ACNC: 1.61 UIU/ML (ref 0.45–4.5)

## 2021-03-22 RX ORDER — ASPIRIN 325 MG
50000 TABLET, DELAYED RELEASE (ENTERIC COATED) ORAL
Qty: 12 CAP | Refills: 1 | Status: SHIPPED | OUTPATIENT
Start: 2021-03-22

## 2021-03-22 NOTE — PROGRESS NOTES
Most of your results are normal.  Your vitamin D level is lower than normal.  I recommend starting a vitamin D supplement to improve this. I will send a supplement to your pharmacy. We will recheck this at a follow up visit.   Mychart result comment sent

## 2021-04-14 ENCOUNTER — TELEPHONE (OUTPATIENT)
Dept: FAMILY MEDICINE CLINIC | Age: 36
End: 2021-04-14

## 2021-07-02 DIAGNOSIS — K20.0 EE (EOSINOPHILIC ESOPHAGITIS): ICD-10-CM

## 2021-07-04 RX ORDER — PANTOPRAZOLE SODIUM 40 MG/1
TABLET, DELAYED RELEASE ORAL
Qty: 180 TABLET | Refills: 1 | Status: SHIPPED | OUTPATIENT
Start: 2021-07-04

## 2022-03-19 PROBLEM — F33.0 DEPRESSION, MAJOR, RECURRENT, MILD (HCC): Status: ACTIVE | Noted: 2020-10-21

## 2022-03-19 PROBLEM — R52 PAIN: Status: ACTIVE | Noted: 2020-12-18

## 2023-05-25 RX ORDER — FAMOTIDINE 40 MG/1
1 TABLET, FILM COATED ORAL NIGHTLY
COMMUNITY
Start: 2020-12-09

## 2023-05-25 RX ORDER — LIDOCAINE HYDROCHLORIDE 30 MG/G
CREAM TOPICAL 3 TIMES DAILY
COMMUNITY
Start: 2021-01-04

## 2023-05-25 RX ORDER — PANTOPRAZOLE SODIUM 40 MG/1
1 TABLET, DELAYED RELEASE ORAL 2 TIMES DAILY
COMMUNITY
Start: 2021-07-04

## 2023-05-25 RX ORDER — AMITRIPTYLINE HYDROCHLORIDE 50 MG/1
50 TABLET, FILM COATED ORAL
COMMUNITY
Start: 2021-03-17

## (undated) DEVICE — TUBING, SUCTION, 1/4" X 10', STRAIGHT: Brand: MEDLINE

## (undated) DEVICE — GARMENT,MEDLINE,DVT,INT,CALF,MED, GEN2: Brand: MEDLINE

## (undated) DEVICE — STERILE POLYISOPRENE POWDER-FREE SURGICAL GLOVES WITH EMOLLIENT COATING: Brand: PROTEXIS

## (undated) DEVICE — INTENDED FOR TISSUE SEPARATION, AND OTHER PROCEDURES THAT REQUIRE A SHARP SURGICAL BLADE TO PUNCTURE OR CUT.: Brand: BARD-PARKER ® CARBON RIB-BACK BLADES

## (undated) DEVICE — SYR 20ML LL STRL LF --

## (undated) DEVICE — STRAP,POSITIONING,KNEE/BODY,FOAM,4X60": Brand: MEDLINE

## (undated) DEVICE — SOLUTION IV 1000ML 0.9% SOD CHL

## (undated) DEVICE — DRAPE SURG W41XL74IN CLR FULL SZ C ARM 3 ADH POLY STRP E

## (undated) DEVICE — HANDLE LT SNAP ON ULT DURABLE LENS FOR TRUMPF ALC DISPOSABLE

## (undated) DEVICE — INSULATED BLADE ELECTRODE: Brand: EDGE

## (undated) DEVICE — INFECTION CONTROL KIT SYS

## (undated) DEVICE — BIT DRL DIA2MM STP L14MM QUIK CPL REUSE

## (undated) DEVICE — DRAPE,LAPAROTOMY,PED,STERILE: Brand: MEDLINE

## (undated) DEVICE — NEEDLE SPNL 20GA L3.5IN YEL HUB S STL REG WALL FIT STYL W/

## (undated) DEVICE — SUTURE VCRL SZ 2-0 L18IN ABSRB UD L26MM CP-2 1/2 CIR REV J762D

## (undated) DEVICE — SCREW EXT FIX L14MM FOR DISTRCTN

## (undated) DEVICE — TUBING HYDR IRR --

## (undated) DEVICE — SOLUTION SURG PREP 26 CC PURPREP

## (undated) DEVICE — FLOSEAL MATRIX IS INDICATED IN SURGICAL PROCEDURES (OTHER THAN IN OPHTHALMIC) AS AN ADJUNCT TO HEMOSTASIS WHEN CONTROL OF BLEEDING BY LIGATURE OR CONVENTIONALPROCEDURES IS INEFFECTIVE OR IMPRACTICAL.: Brand: FLOSEAL HEMOSTATIC MATRIX

## (undated) DEVICE — TELFA ADHESIVE ISLAND DRESSING: Brand: TELFA

## (undated) DEVICE — DRAPE MICSCP W46XL120IN POLY DRAWSTRAP W STEREO OBS TB AND

## (undated) DEVICE — SPONGE: SPECIALTY PEANUT XR 100/CS: Brand: MEDICAL ACTION INDUSTRIES

## (undated) DEVICE — TOOL 14MH30 LEGEND 14CM 3MM: Brand: MIDAS REX ™

## (undated) DEVICE — SOLUTION IV 250ML 0.9% SOD CHL CLR INJ FLX BG CONT PRT CLSR

## (undated) DEVICE — BIPOLAR IRRIGATOR INTEGRATED TUBING AND BIPOLAR CORD SET, DISPOSABLE

## (undated) DEVICE — BONE WAX WHITE: Brand: BONE WAX WHITE

## (undated) DEVICE — SUTURE MCRYL SZ 4-0 L27IN ABSRB UD L19MM PS-2 1/2 CIR PRIM Y426H

## (undated) DEVICE — LAMINECTOMY RICHMOND-LF: Brand: MEDLINE INDUSTRIES, INC.

## (undated) DEVICE — COVER,TABLE,HEAVY DUTY,60"X90",STRL: Brand: MEDLINE

## (undated) DEVICE — FORCEPS BX L240CM JAW DIA2.8MM L CAP W/ NDL MIC MESH TOOTH

## (undated) DEVICE — DERMABOND SKIN ADH 0.7ML -- DERMABOND ADVANCED 12/BX